# Patient Record
Sex: FEMALE | Race: WHITE | URBAN - METROPOLITAN AREA
[De-identification: names, ages, dates, MRNs, and addresses within clinical notes are randomized per-mention and may not be internally consistent; named-entity substitution may affect disease eponyms.]

---

## 2017-02-13 ENCOUNTER — OFFICE VISIT (OUTPATIENT)
Dept: FAMILY MEDICINE | Facility: CLINIC | Age: 42
End: 2017-02-13
Payer: COMMERCIAL

## 2017-02-13 VITALS
DIASTOLIC BLOOD PRESSURE: 50 MMHG | WEIGHT: 117.5 LBS | HEART RATE: 104 BPM | RESPIRATION RATE: 20 BRPM | TEMPERATURE: 99.1 F | OXYGEN SATURATION: 100 % | HEIGHT: 62 IN | SYSTOLIC BLOOD PRESSURE: 98 MMHG | BODY MASS INDEX: 21.62 KG/M2

## 2017-02-13 DIAGNOSIS — R07.0 THROAT PAIN: Primary | ICD-10-CM

## 2017-02-13 LAB
DEPRECATED S PYO AG THROAT QL EIA: NORMAL
MICRO REPORT STATUS: NORMAL
SPECIMEN SOURCE: NORMAL

## 2017-02-13 PROCEDURE — 87880 STREP A ASSAY W/OPTIC: CPT | Performed by: FAMILY MEDICINE

## 2017-02-13 PROCEDURE — 87081 CULTURE SCREEN ONLY: CPT | Performed by: FAMILY MEDICINE

## 2017-02-13 PROCEDURE — 99213 OFFICE O/P EST LOW 20 MIN: CPT | Performed by: FAMILY MEDICINE

## 2017-02-13 NOTE — NURSING NOTE
"Chief Complaint   Patient presents with     URI       Initial BP 98/50 (BP Location: Right arm, Cuff Size: Adult Regular)  Pulse 104  Temp 99.1  F (37.3  C) (Oral)  Resp 20  Ht 5' 2\" (1.575 m)  Wt 117 lb 8 oz (53.3 kg)  LMP 01/16/2017 (Approximate)  SpO2 100%  Breastfeeding? No  BMI 21.49 kg/m2 Estimated body mass index is 21.49 kg/(m^2) as calculated from the following:    Height as of this encounter: 5' 2\" (1.575 m).    Weight as of this encounter: 117 lb 8 oz (53.3 kg).  Medication Reconciliation: complete     Sallie Gill MA        "

## 2017-02-13 NOTE — PROGRESS NOTES
"  SUBJECTIVE:                                                    Meseret Etienne is a 41 year old female who presents to clinic today for the following health issues:    Sore throat since 02/11/2017. A/s with throat congestion. Last night her jaw was throbbing, pain has resolved. Pt endorses dry cough at night when she is sleeping No fever, chills, facial pain, ear pain or shortness of breath. Pt is taking Ibuprofen Q4H PRN. No contact with strep or mono.     Problem list and histories reviewed & adjusted, as indicated.  Additional history: as documented    Problem list, Medication list, Allergies, and Medical/Social/Surgical histories reviewed in EPIC and updated as appropriate.    ROS:  Constitutional, HEENT, cardiovascular, pulmonary, gi and gu systems are negative, except as otherwise noted.    OBJECTIVE:                                                    BP 98/50 (BP Location: Right arm, Cuff Size: Adult Regular)  Pulse 104  Temp 99.1  F (37.3  C) (Oral)  Resp 20  Ht 5' 2\" (1.575 m)  Wt 117 lb 8 oz (53.3 kg)  LMP 01/16/2017 (Approximate)  SpO2 100%  Breastfeeding? No  BMI 21.49 kg/m2  Body mass index is 21.49 kg/(m^2).  GENERAL: healthy, alert and no distress  EYES: Eyes grossly normal to inspection  HENT: ear canals and TM's normal, nose and mouth without ulcers or lesions  NECK: no adenopathy  RESP: lungs clear to auscultation - no rales, rhonchi or wheezes  CV: regular rate and rhythm, normal S1 S2, no S3 or S4    Diagnostic Test Results:  Results for orders placed or performed in visit on 02/13/17   Rapid strep screen   Result Value Ref Range    Specimen Description Throat     Rapid Strep A Screen       NEGATIVE: No Group A streptococcal antigen detected by immunoassay, await   culture report.      Micro Report Status FINAL 02/13/2017         ASSESSMENT/PLAN:                                                      1. Throat pain  - Rapid strep screen negative, pending Beta strep group A culture  - Continue " supportive cares  - Sudafed or Flonase  - Follow if symptoms worsen or fail to improve.    Zack Fernandez MD  [facility]

## 2017-02-13 NOTE — LETTER
[facility]   [address] [place]   [address]  [phone]    February 15, 2017      Meseret Etienne  [address] [facility] [address]            Dear Ms. Etienne,    Here are your results for your recent labs. Your throat culture was negative which means you do not have streptococcal pharyngitis. Please call or message me if you have questions or concerns.     Results for orders placed or performed in visit on 02/13/17   Rapid strep screen   Result Value Ref Range    Specimen Description Throat     Rapid Strep A Screen       NEGATIVE: No Group A streptococcal antigen detected by immunoassay, await   culture report.      Micro Report Status FINAL 02/13/2017    Beta strep group A culture   Result Value Ref Range    Specimen Description Throat     Culture Micro No Beta Streptococcus isolated     Micro Report Status FINAL 02/15/2017          Sincerely,    Zack Fernandez MD/nr

## 2017-02-13 NOTE — MR AVS SNAPSHOT
"              After Visit Summary   2017    Meseret Etienne    MRN: [id number]           Patient Information     Date Of Birth          1975        Visit Information        Provider Department      2017 2:40 PM Zack Fernandez MD [facility]        Today's Diagnoses     Throat pain    -  1       Follow-ups after your visit        Who to contact     If you have questions or need follow up information about today's clinic visit or your schedule please contact [facility] directly at [phone].  Normal or non-critical lab and imaging results will be communicated to you by Unboundhart, letter or phone within 4 business days after the clinic has received the results. If you do not hear from us within 7 days, please contact the clinic through Unboundhart or phone. If you have a critical or abnormal lab result, we will notify you by phone as soon as possible.  Submit refill requests through 360imaging or call your pharmacy and they will forward the refill request to us. Please allow 3 business days for your refill to be completed.          Additional Information About Your Visit        MyChart Information     360imaging lets you send messages to your doctor, view your test results, renew your prescriptions, schedule appointments and more. To sign up, go to [email or web address] . Click on \"Log in\" on the left side of the screen, which will take you to the Welcome page. Then click on \"Sign up Now\" on the right side of the page.     You will be asked to enter the access code listed below, as well as some personal information. Please follow the directions to create your username and password.     Your access code is: [email or web address]  Expires: 2017  3:14 PM     Your access code will  in 90 days. If you need help or a new code, please call your [facility] or [phone].        Care EveryWhere ID     This is your Care EveryWhere ID. This could be used by other " "organizations to access your [facility] medical records  [id number]        Your Vitals Were     Pulse Temperature Respirations Height Last Period Pulse Oximetry    104 99.1  F (37.3  C) (Oral) 20 5' 2\" (1.575 m) 01/16/2017 (Approximate) 100%    Breastfeeding? BMI (Body Mass Index)                No 21.49 kg/m2           Blood Pressure from Last 3 Encounters:   02/13/17 98/50   10/24/16 100/78   03/14/16 94/62    Weight from Last 3 Encounters:   02/13/17 117 lb 8 oz (53.3 kg)   10/24/16 115 lb (52.2 kg)   08/18/15 104 lb (47.2 kg)              We Performed the Following     Beta strep group A culture     Rapid strep screen        Primary Care Provider Office Phone # Fax #    Lana Culver [phone]       [facility] [address]  [facility] [address]        Thank you!     Thank you for choosing [facility]  for your care. Our goal is always to provide you with excellent care. Hearing back from our patients is one way we can continue to improve our services. Please take a few minutes to complete the written survey that you may receive in the mail after your visit with us. Thank you!             Your Updated Medication List - Protect others around you: Learn how to safely use, store and throw away your medicines at www.disposemymeds.org.          This list is accurate as of: 2/13/17  3:14 PM.  Always use your most recent med list.                   Brand Name Dispense Instructions for use    clindamycin 1 % lotion    CLEOCIN T    60 mL    Apply to affected areas once daily.       * clindamycin-benzoyl Per (Refr) 1.2-5 % Gel     45 g    Apply to affected areas daily as needed       * clindamycin-benzoyl Per (Refr) 1.2-5 % Gel     45 g    Apply to affected areas daily as needed       tretinoin 0.025 % topical gel    RETIN-A    45 g    Spread a pea size amount into affected area topically at bedtime.  Use sunscreen SPF>20.       valACYclovir 1000 mg tablet    VALTREX    " 12 tablet    Take 2 tablets (2,000 mg) by mouth 2 times daily for one day as needed.       * Notice:  This list has 2 medication(s) that are the same as other medications prescribed for you. Read the directions carefully, and ask your doctor or other care provider to review them with you.

## 2017-02-15 ENCOUNTER — OFFICE VISIT (OUTPATIENT)
Dept: FAMILY MEDICINE | Facility: CLINIC | Age: 42
End: 2017-02-15
Payer: COMMERCIAL

## 2017-02-15 ENCOUNTER — TELEPHONE (OUTPATIENT)
Dept: FAMILY MEDICINE | Facility: CLINIC | Age: 42
End: 2017-02-15

## 2017-02-15 VITALS
OXYGEN SATURATION: 99 % | DIASTOLIC BLOOD PRESSURE: 56 MMHG | HEART RATE: 82 BPM | RESPIRATION RATE: 12 BRPM | WEIGHT: 117.5 LBS | TEMPERATURE: 97.5 F | SYSTOLIC BLOOD PRESSURE: 86 MMHG | BODY MASS INDEX: 21.49 KG/M2

## 2017-02-15 DIAGNOSIS — R07.0 THROAT PAIN: ICD-10-CM

## 2017-02-15 DIAGNOSIS — K14.8 TONGUE LESION: ICD-10-CM

## 2017-02-15 DIAGNOSIS — J01.90 ACUTE NON-RECURRENT SINUSITIS, UNSPECIFIED LOCATION: Primary | ICD-10-CM

## 2017-02-15 LAB
BACTERIA SPEC CULT: NORMAL
DEPRECATED S PYO AG THROAT QL EIA: NORMAL
MICRO REPORT STATUS: NORMAL
MICRO REPORT STATUS: NORMAL
SPECIMEN SOURCE: NORMAL
SPECIMEN SOURCE: NORMAL

## 2017-02-15 PROCEDURE — 87880 STREP A ASSAY W/OPTIC: CPT | Performed by: NURSE PRACTITIONER

## 2017-02-15 PROCEDURE — 99213 OFFICE O/P EST LOW 20 MIN: CPT | Performed by: NURSE PRACTITIONER

## 2017-02-15 PROCEDURE — 87081 CULTURE SCREEN ONLY: CPT | Performed by: NURSE PRACTITIONER

## 2017-02-15 RX ORDER — AZITHROMYCIN 250 MG/1
TABLET, FILM COATED ORAL
Qty: 6 TABLET | Refills: 0 | Status: SHIPPED | OUTPATIENT
Start: 2017-02-15 | End: 2018-06-26

## 2017-02-15 NOTE — PATIENT INSTRUCTIONS
1.  Strep test was negative again.  Will cover for strep and sinus infection with azithromycin, 2 pills today, then 1 pill per day for 3 more days.  Ibuprofen 3-4 tabs three times a day.  Salt water gargles.  Humidifier at night.  Plenty of fluids.    2.  Follow up if worsening or not improving by Monday.  If difficulty breathing or drooling during the day go into the ER.

## 2017-02-15 NOTE — NURSING NOTE
"Chief Complaint   Patient presents with     URI       Initial BP (!) 86/56 (BP Location: Left arm, Patient Position: Chair, Cuff Size: Adult Regular)  Pulse 82  Temp 97.5  F (36.4  C) (Tympanic)  Resp 12  Wt 117 lb 8 oz (53.3 kg)  LMP 01/16/2017 (Approximate)  SpO2 99%  BMI 21.49 kg/m2 Estimated body mass index is 21.49 kg/(m^2) as calculated from the following:    Height as of 2/13/17: 5' 2\" (1.575 m).    Weight as of this encounter: 117 lb 8 oz (53.3 kg).  Medication Reconciliation: complete     Wendy Garg, CMA    "

## 2017-02-15 NOTE — PROGRESS NOTES
Please send the letter to the patient with the following.         Here are your results for your recent labs. Your throat culture was negative which means you do not have streptococcal pharyngitis. Please call or message me if you have questions or concerns.

## 2017-02-15 NOTE — MR AVS SNAPSHOT
"              After Visit Summary   2/15/2017    Meseret Etienne    MRN: [id number]           Patient Information     Date Of Birth          1975        Visit Information        Provider Department      2/15/2017 10:20 AM Henrietta Erickson APRN CNP [facility]        Today's Diagnoses     Throat pain    -  1    Acute non-recurrent sinusitis, unspecified location          Care Instructions    1.  Strep test was negative again.  Will cover for strep and sinus infection with azithromycin, 2 pills today, then 1 pill per day for 3 more days.  Ibuprofen 3-4 tabs three times a day.  Salt water gargles.  Humidifier at night.  Plenty of fluids.    2.  Follow up if worsening or not improving by Monday.  If difficulty breathing or drooling during the day go into the ER.        Follow-ups after your visit        Who to contact     If you have questions or need follow up information about today's clinic visit or your schedule please contact [facility] directly at [phone].  Normal or non-critical lab and imaging results will be communicated to you by QFPayhart, letter or phone within 4 business days after the clinic has received the results. If you do not hear from us within 7 days, please contact the clinic through Sliced Applest or phone. If you have a critical or abnormal lab result, we will notify you by phone as soon as possible.  Submit refill requests through Shopear or call your pharmacy and they will forward the refill request to us. Please allow 3 business days for your refill to be completed.          Additional Information About Your Visit        QFPayhart Information     Shopear lets you send messages to your doctor, view your test results, renew your prescriptions, schedule appointments and more. To sign up, go to [email or web address] . Click on \"Log in\" on the left side of the screen, which will take you to the Welcome page. Then click on \"Sign up Now\" on the right side of " the page.     You will be asked to enter the access code listed below, as well as some personal information. Please follow the directions to create your username and password.     Your access code is: [email or web address]  Expires: 2017  3:14 PM     Your access code will  in 90 days. If you need help or a new code, please call your [facility] clinic or [phone].        Care EveryWhere ID     This is your Care EveryWhere ID. This could be used by other organizations to access your [facility] medical records  [id number]        Your Vitals Were     Pulse Temperature Respirations Last Period Pulse Oximetry BMI (Body Mass Index)    82 97.5  F (36.4  C) (Tympanic) 12 2017 (Approximate) 99% 21.49 kg/m2       Blood Pressure from Last 3 Encounters:   02/15/17 (!) 86/56   17 98/50   10/24/16 100/78    Weight from Last 3 Encounters:   02/15/17 117 lb 8 oz (53.3 kg)   17 117 lb 8 oz (53.3 kg)   10/24/16 115 lb (52.2 kg)              We Performed the Following     Beta strep group A culture     Strep, Rapid Screen          Today's Medication Changes          These changes are accurate as of: 2/15/17 10:48 AM.  If you have any questions, ask your nurse or doctor.               Start taking these medicines.        Dose/Directions    azithromycin 250 MG tablet   Commonly known as:  ZITHROMAX   Used for:  Acute non-recurrent sinusitis, unspecified location   Started by:  Henrietta Erickson APRN CNP        Two tablets first day, then one tablet daily for four days.   Quantity:  6 tablet   Refills:  0            Where to get your medicines      These medications were sent to [facility] - [place], [place] - [address]  [address], [facility] [address]     Phone:  [phone]     azithromycin 250 MG tablet                Primary Care Provider Office Phone # Fax #    Lana Culver [phone]       [facility] [address]  [facility] [address]         Thank you!     Thank you for choosing [facility]  for your care. Our goal is always to provide you with excellent care. Hearing back from our patients is one way we can continue to improve our services. Please take a few minutes to complete the written survey that you may receive in the mail after your visit with us. Thank you!             Your Updated Medication List - Protect others around you: Learn how to safely use, store and throw away your medicines at www.disposemymeds.org.          This list is accurate as of: 2/15/17 10:48 AM.  Always use your most recent med list.                   Brand Name Dispense Instructions for use    azithromycin 250 MG tablet    ZITHROMAX    6 tablet    Two tablets first day, then one tablet daily for four days.       clindamycin 1 % lotion    CLEOCIN T    60 mL    Apply to affected areas once daily.       * clindamycin-benzoyl Per (Refr) 1.2-5 % Gel     45 g    Apply to affected areas daily as needed       * clindamycin-benzoyl Per (Refr) 1.2-5 % Gel     45 g    Apply to affected areas daily as needed       tretinoin 0.025 % topical gel    RETIN-A    45 g    Spread a pea size amount into affected area topically at bedtime.  Use sunscreen SPF>20.       valACYclovir 1000 mg tablet    VALTREX    12 tablet    Take 2 tablets (2,000 mg) by mouth 2 times daily for one day as needed.       * Notice:  This list has 2 medication(s) that are the same as other medications prescribed for you. Read the directions carefully, and ask your doctor or other care provider to review them with you.

## 2017-02-15 NOTE — TELEPHONE ENCOUNTER
Meseret Etienne is a 41 year old female who calls with worsening sore throat.    NURSING ASSESSMENT:  Description:  Patient seen 2/13/17 for sore throat - strep swap and culture negative - calling today with worsening symptoms  Onset/duration:  2 days  Precip. factors: sore throat  Associated symptoms:    1. Sore throat - states worsening over last two days   -white patches   -increased soreness/pain   -drooling   -denies SOB, wheezing, inability to swallow    -patient is speaking in full sentences      Last exam/Treatment:  2/13/2017  Allergies:   Allergies   Allergen Reactions     Penicillins Swelling     Eye lids       Latex Rash     NURSING PLAN: Nursing advice to patient to clinic in one hour - office visit with Dre 10:20 - to ED/911 for inability to breathe, throat closing, cannot swallow    RECOMMENDED DISPOSITION:  To office now, another person to drive - see above  Will comply with recommendation: Yes  If further questions/concerns or if symptoms do not improve, worsen or new symptoms develop, call your PCP or [facility] as soon as possible.      Guideline used:  Telephone Triage Protocols for Nurses, Fifth Edition, Bianca Baldwin RN

## 2017-02-15 NOTE — PROGRESS NOTES
SUBJECTIVE:                                                    Meseret Etienne is a 41 year old female who presents to clinic today for the following health issues:      RESPIRATORY SYMPTOMS      Duration: Saturday    Description  sore throat and drooling, painful swallowing, trouble breathing at night    Severity: severe    Accompanying signs and symptoms: None    History (predisposing factors):  none    Precipitating or alleviating factors: None    Therapies tried and outcome:  Guaifenesin, chloraseptic, cough drops, tea, saltwater gargles, popsicles       Seen for throat pain x 2 days 2/13/17, negative RST, was advised on supportive cares.    Sore throat is getting worse, now having difficulty swallowing.  No difficulty breathing, drooling more in her sleep because it hurts so much to swallow.  Chills last night, temp 99.9.  Mild fatigue but nothing significant.    Also having mild facial pressure, upper jaw pain.  Headache.  Rhinitis and congestion started last night.  Throat clearing cough.  No shortness of breath or wheezing.    Works at a spa.      No history recurrent tonsillitis or sinus infection in the past.      Negative oral cancer screening by dentist 1 month ago.    Patient Active Problem List   Diagnosis     Acne     Recurrent oral herpes simplex     Cervical high risk HPV (human papillomavirus) test positive     Past Surgical History   Procedure Laterality Date     No history of surgery       Dilation and curettage         Social History   Substance Use Topics     Smoking status: Former Smoker     Packs/day: 1.00     Types: Cigarettes     Smokeless tobacco: Not on file      Comment: occ     Alcohol use Yes      Comment: occasionally     Family History   Problem Relation Age of Onset     Asthma Mother      Cardiovascular Maternal Grandmother      pacemaker     C.A.D. No family hx of      DIABETES No family hx of      Hypertension No family hx of      Breast Cancer No family hx of      Cancer -  colorectal No family hx of      CEREBROVASCULAR DISEASE No family hx of          Current Outpatient Prescriptions   Medication Sig Dispense Refill     azithromycin (ZITHROMAX) 250 MG tablet Two tablets first day, then one tablet daily for four days. 6 tablet 0     clindamycin (CLEOCIN T) 1 % lotion Apply to affected areas once daily. 60 mL 3     clindamycin-benzoyl Per, Refr, 1.2-5 % GEL Apply to affected areas daily as needed 45 g 3     tretinoin (RETIN-A) 0.025 % gel Spread a pea size amount into affected area topically at bedtime.  Use sunscreen SPF>20. 45 g 3     valACYclovir (VALTREX) 1000 mg tablet Take 2 tablets (2,000 mg) by mouth 2 times daily for one day as needed. 12 tablet 1     clindamycin-benzoyl Per, Refr, 1.2-5 % GEL Apply to affected areas daily as needed 45 g 1       ROS:  Const, HEENT, Resp as above, otherwise negative       OBJECTIVE:                                                    BP (!) 86/56 (BP Location: Left arm, Patient Position: Chair, Cuff Size: Adult Regular)  Pulse 82  Temp 97.5  F (36.4  C) (Tympanic)  Resp 12  Wt 117 lb 8 oz (53.3 kg)  LMP 01/16/2017 (Approximate)  SpO2 99%  BMI 21.49 kg/m2   GENERAL APPEARANCE: healthy, alert and no distress  EYES: Eyes grossly normal to inspection and conjunctivae and sclerae normal  HENT: ear canals and TM's normal.  Tonsils +2 bilaterally with white exudate present.  Brown rased lesion to back/center of tongue, does seem to partially scrape off.  Persistent despite drinking water.  NECK: no adenopathy  RESP: lungs clear to auscultation - no rales, rhonchi or wheezes  CV: regular rates and rhythm, normal S1 S2, no S3 or S4 and no murmur, click or rub  PSYCH: mentation appears normal and affect normal/bright     Diagnostic test results:  Diagnostic Test Results:  Results for orders placed or performed in visit on 02/15/17 (from the past 24 hour(s))   Strep, Rapid Screen   Result Value Ref Range    Specimen Description Throat     Rapid Strep A  Screen       NEGATIVE: No Group A streptococcal antigen detected by immunoassay, await   culture report.      Micro Report Status FINAL 02/15/2017         ASSESSMENT/PLAN:                                                    (J01.90) Acute non-recurrent sinusitis, unspecified location  Comment: given subjective fever, tonsilar exudate, and sinus symptoms will treat for possible bacterial infection  Plan: azithromycin (ZITHROMAX) 250 MG tablet        Start zpack, scheduled ibuprofen, salt water gargles.  Follow up if not improving by Monday, sooner if worsening.  If difficulty breathing or drooling during the day go into the ER.    R07.0) Throat pain  (primary encounter diagnosis)  Plan: Strep, Rapid Screen, Beta strep group A culture        As above     (K14.8) Tongue lesion  Comment: unclear etiology, appearance not consistent with SCC but pt does smoke tobacco  Plan: monitor, if not resolving in 1-2 weeks see ENT      See Patient Instructions    Henrietta Erickson [facility]    Patient Instructions   1.  Strep test was negative again.  Will cover for strep and sinus infection with azithromycin, 2 pills today, then 1 pill per day for 3 more days.  Ibuprofen 3-4 tabs three times a day.  Salt water gargles.  Humidifier at night.  Plenty of fluids.    2.  Follow up if worsening or not improving by Monday.  If difficulty breathing or drooling during the day go into the ER.

## 2017-02-17 LAB
BACTERIA SPEC CULT: NORMAL
MICRO REPORT STATUS: NORMAL
SPECIMEN SOURCE: NORMAL

## 2017-03-17 ENCOUNTER — OFFICE VISIT (OUTPATIENT)
Dept: URGENT CARE | Facility: URGENT CARE | Age: 42
End: 2017-03-17
Payer: COMMERCIAL

## 2017-03-17 VITALS
TEMPERATURE: 97.5 F | SYSTOLIC BLOOD PRESSURE: 124 MMHG | DIASTOLIC BLOOD PRESSURE: 75 MMHG | BODY MASS INDEX: 21.71 KG/M2 | OXYGEN SATURATION: 97 % | WEIGHT: 115 LBS | HEART RATE: 91 BPM | HEIGHT: 61 IN

## 2017-03-17 DIAGNOSIS — J03.90 TONSILLITIS: Primary | ICD-10-CM

## 2017-03-17 DIAGNOSIS — R07.0 THROAT PAIN: ICD-10-CM

## 2017-03-17 LAB
DEPRECATED S PYO AG THROAT QL EIA: NORMAL
MICRO REPORT STATUS: NORMAL
SPECIMEN SOURCE: NORMAL

## 2017-03-17 PROCEDURE — 87880 STREP A ASSAY W/OPTIC: CPT | Performed by: INTERNAL MEDICINE

## 2017-03-17 PROCEDURE — 87081 CULTURE SCREEN ONLY: CPT | Performed by: INTERNAL MEDICINE

## 2017-03-17 PROCEDURE — 99213 OFFICE O/P EST LOW 20 MIN: CPT | Performed by: FAMILY MEDICINE

## 2017-03-17 RX ORDER — CLINDAMYCIN HCL 300 MG
300 CAPSULE ORAL 4 TIMES DAILY
Qty: 28 CAPSULE | Refills: 0 | Status: SHIPPED | OUTPATIENT
Start: 2017-03-17 | End: 2017-03-24

## 2017-03-17 NOTE — MR AVS SNAPSHOT
"              After Visit Summary   3/17/2017    Meseret Etienne    MRN: [id number]           Patient Information     Date Of Birth          1975        Visit Information        Provider Department      3/17/2017 5:00 PM Daksha Real DO [facility]        Today's Diagnoses     Throat pain    -  1       Follow-ups after your visit        Who to contact     If you have questions or need follow up information about today's clinic visit or your schedule please contact [facility]RG[facility] directly at [phone].  Normal or non-critical lab and imaging results will be communicated to you by [employer], letter or phone within 4 business days after the clinic has received the results. If you do not hear from us within 7 days, please contact the clinic through [employer] or phone. If you have a critical or abnormal lab result, we will notify you by phone as soon as possible.  Submit refill requests through [employer] or call your pharmacy and they will forward the refill request to us. Please allow 3 business days for your refill to be completed.          Additional Information About Your Visit        MyChart Information     [employer] lets you send messages to your doctor, view your test results, renew your prescriptions, schedule appointments and more. To sign up, go to [email or web address] . Click on \"Log in\" on the left side of the screen, which will take you to the Welcome page. Then click on \"Sign up Now\" on the right side of the page.     You will be asked to enter the access code listed below, as well as some personal information. Please follow the directions to create your username and password.     Your access code is: [email or web address]  Expires: 2017  4:14 PM     Your access code will  in 90 days. If you need help or a new code, please call your [place] clinic or [phone].        Care EveryWhere ID     This is your Care EveryWhere ID. This could be used " "by other organizations to access your [place] medical records  [id number]        Your Vitals Were     Pulse Temperature Height Last Period Pulse Oximetry Breastfeeding?    91 97.5  F (36.4  C) (Tympanic) 5' 1\" (1.549 m) 02/17/2017 97% No    BMI (Body Mass Index)                   21.73 kg/m2            Blood Pressure from Last 3 Encounters:   03/17/17 124/75   02/15/17 (!) 86/56   02/13/17 98/50    Weight from Last 3 Encounters:   03/17/17 115 lb (52.2 kg)   02/15/17 117 lb 8 oz (53.3 kg)   02/13/17 117 lb 8 oz (53.3 kg)              We Performed the Following     Beta strep group A culture     Strep, Rapid Screen          Today's Medication Changes          These changes are accurate as of: 3/17/17  5:40 PM.  If you have any questions, ask your nurse or doctor.               Start taking these medicines.        Dose/Directions    clindamycin 300 MG capsule   Commonly known as:  CLEOCIN   Used for:  Throat pain   Started by:  Daksha Real DO        Dose:  300 mg   Take 1 capsule (300 mg) by mouth 4 times daily for 7 days   Quantity:  28 capsule   Refills:  0            Where to get your medicines      These medications were sent to [place] [address] [place] [facility] [place], [place] - [address] [facility]  [address], [place] [address]     Phone:  [phone]     clindamycin 300 MG capsule                Primary Care Provider Office Phone # Fax #    Lana Culver [phone]       [facility] [address]  [facility] [address]        Thank you!     Thank you for choosing [facility]RGENT CARE  for your care. Our goal is always to provide you with excellent care. Hearing back from our patients is one way we can continue to improve our services. Please take a few minutes to complete the written survey that you may receive in the mail after your visit with us. Thank you!             Your Updated Medication List - Protect others around you: Learn how to safely use, store " and throw away your medicines at www.disposemymeds.org.          This list is accurate as of: 3/17/17  5:40 PM.  Always use your most recent med list.                   Brand Name Dispense Instructions for use    azithromycin 250 MG tablet    ZITHROMAX    6 tablet    Two tablets first day, then one tablet daily for four days.       clindamycin 1 % lotion    CLEOCIN T    60 mL    Apply to affected areas once daily.       clindamycin 300 MG capsule    CLEOCIN    28 capsule    Take 1 capsule (300 mg) by mouth 4 times daily for 7 days       * clindamycin-benzoyl Per (Refr) 1.2-5 % Gel     45 g    Apply to affected areas daily as needed       * clindamycin-benzoyl Per (Refr) 1.2-5 % Gel     45 g    Apply to affected areas daily as needed       tretinoin 0.025 % topical gel    RETIN-A    45 g    Spread a pea size amount into affected area topically at bedtime.  Use sunscreen SPF>20.       valACYclovir 1000 mg tablet    VALTREX    12 tablet    Take 2 tablets (2,000 mg) by mouth 2 times daily for one day as needed.       * Notice:  This list has 2 medication(s) that are the same as other medications prescribed for you. Read the directions carefully, and ask your doctor or other care provider to review them with you.

## 2017-03-17 NOTE — NURSING NOTE
"Chief Complaint   Patient presents with     Urgent Care     Pharyngitis     c/o sore throat.fever.HA and bodyaches       Initial /75 (BP Location: Left arm, Patient Position: Chair, Cuff Size: Adult Regular)  Pulse 91  Temp 97.5  F (36.4  C) (Tympanic)  Ht 5' 1\" (1.549 m)  Wt 115 lb (52.2 kg)  LMP 02/17/2017  SpO2 97%  Breastfeeding? No  BMI 21.73 kg/m2 Estimated body mass index is 21.73 kg/(m^2) as calculated from the following:    Height as of this encounter: 5' 1\" (1.549 m).    Weight as of this encounter: 115 lb (52.2 kg).  Medication Reconciliation: complete   Melvina Shea MA    "

## 2017-03-19 LAB
BACTERIA SPEC CULT: NORMAL
MICRO REPORT STATUS: NORMAL
SPECIMEN SOURCE: NORMAL

## 2017-03-23 ENCOUNTER — TELEPHONE (OUTPATIENT)
Dept: FAMILY MEDICINE | Facility: CLINIC | Age: 42
End: 2017-03-23

## 2017-03-23 DIAGNOSIS — L98.9 SKIN PROBLEM: Primary | ICD-10-CM

## 2017-03-23 NOTE — TELEPHONE ENCOUNTER
Reason for Call: Request for an order or referral:    Order or referral being requested: DERMATOLOGIST    Date needed: as soon as possible    Has the patient been seen by the PCP for this problem? NO    Additional comments: Pt has raised bumps on her chin. It is not acne. The bumps  have been there for awhile. Pt would like to see a Dermatologist. Pt's insurance is [employer] [place] which does not require an insurance referral only an Order approved by Dr Culver.    Phone number Patient can be reached at:  Home number on file [phone] (home)    Best Time:  Any Time    Can we leave a detailed message on this number?  YES    Call taken on 3/23/2017 at 2:40 PM by Yaritza Kahn

## 2017-03-23 NOTE — TELEPHONE ENCOUNTER
I left a voice mail for Meseret to ask her to call back to give us an idea what part of town she would like to go and also does she just want the phone numbers or does she want the referral mailed to her?    Gloria Juarez RN

## 2017-03-24 NOTE — TELEPHONE ENCOUNTER
Patient called back stating she lives in [place] [facility] [place] so anywhere close to [place] [facility] [place] and giving phone numbers is fine

## 2017-03-24 NOTE — TELEPHONE ENCOUNTER
Called patient, who asked writer to call back and leave voicemail with referral information as patient currently driving.    Writer left voicemail with referral information.    PRACHI CainN, RN

## 2017-11-25 ENCOUNTER — HEALTH MAINTENANCE LETTER (OUTPATIENT)
Age: 42
End: 2017-11-25

## 2017-12-30 ENCOUNTER — HEALTH MAINTENANCE LETTER (OUTPATIENT)
Age: 42
End: 2017-12-30

## 2018-01-18 ENCOUNTER — TELEPHONE (OUTPATIENT)
Dept: FAMILY MEDICINE | Facility: CLINIC | Age: 43
End: 2018-01-18

## 2018-05-17 ENCOUNTER — TELEPHONE (OUTPATIENT)
Dept: FAMILY MEDICINE | Facility: CLINIC | Age: 43
End: 2018-05-17

## 2018-05-17 DIAGNOSIS — R87.810 CERVICAL HIGH RISK HPV (HUMAN PAPILLOMAVIRUS) TEST POSITIVE: ICD-10-CM

## 2018-05-17 NOTE — TELEPHONE ENCOUNTER
Pt is past due for f/u pap smear/HPV test.  1 reminder letter sent previously.  Left msg today for patient to call clinic to schedule.    If no reply and/or appt within two weeks (5/31/18) patient will be considered lost to pap tracking f/u.    PRACHI CookN, RN, Pap Tracking Nurse

## 2018-06-26 ENCOUNTER — TELEPHONE (OUTPATIENT)
Dept: FAMILY MEDICINE | Facility: CLINIC | Age: 43
End: 2018-06-26

## 2018-06-26 ENCOUNTER — OFFICE VISIT (OUTPATIENT)
Dept: FAMILY MEDICINE | Facility: CLINIC | Age: 43
End: 2018-06-26
Payer: COMMERCIAL

## 2018-06-26 VITALS
DIASTOLIC BLOOD PRESSURE: 76 MMHG | HEIGHT: 61 IN | BODY MASS INDEX: 22.66 KG/M2 | WEIGHT: 120 LBS | RESPIRATION RATE: 14 BRPM | TEMPERATURE: 98.2 F | SYSTOLIC BLOOD PRESSURE: 118 MMHG | OXYGEN SATURATION: 99 % | HEART RATE: 76 BPM

## 2018-06-26 DIAGNOSIS — Z01.411 ENCOUNTER FOR GYNECOLOGICAL EXAMINATION WITH ABNORMAL FINDING: Primary | ICD-10-CM

## 2018-06-26 DIAGNOSIS — R87.810 CERVICAL HIGH RISK HPV (HUMAN PAPILLOMAVIRUS) TEST POSITIVE: ICD-10-CM

## 2018-06-26 DIAGNOSIS — L70.0 ACNE VULGARIS: ICD-10-CM

## 2018-06-26 PROBLEM — Z13.6 CARDIOVASCULAR SCREENING; LDL GOAL LESS THAN 160: Status: ACTIVE | Noted: 2018-06-26

## 2018-06-26 PROCEDURE — 88175 CYTOPATH C/V AUTO FLUID REDO: CPT | Performed by: PHYSICIAN ASSISTANT

## 2018-06-26 PROCEDURE — G0476 HPV COMBO ASSAY CA SCREEN: HCPCS | Performed by: PHYSICIAN ASSISTANT

## 2018-06-26 PROCEDURE — 99396 PREV VISIT EST AGE 40-64: CPT | Performed by: PHYSICIAN ASSISTANT

## 2018-06-26 RX ORDER — TRETINOIN 0.25 MG/G
GEL TOPICAL
Qty: 45 G | Refills: 3 | Status: SHIPPED | OUTPATIENT
Start: 2018-06-26 | End: 2019-03-15

## 2018-06-26 RX ORDER — CLINDAMYCIN PHOSPHATE 10 UG/ML
LOTION TOPICAL
Qty: 60 ML | Refills: 3 | Status: SHIPPED | OUTPATIENT
Start: 2018-06-26

## 2018-06-26 RX ORDER — CLINDAMYCIN PHOSPHATE AND BENZOYL PEROXIDE 10; 50 MG/G; MG/G
GEL TOPICAL
Qty: 45 G | Refills: 3 | Status: CANCELLED | OUTPATIENT
Start: 2018-06-26

## 2018-06-26 RX ORDER — CLINDAMYCIN PHOSPHATE AND BENZOYL PEROXIDE 10; 50 MG/G; MG/G
GEL TOPICAL
Qty: 45 G | Refills: 3 | Status: SHIPPED | OUTPATIENT
Start: 2018-06-26

## 2018-06-26 NOTE — TELEPHONE ENCOUNTER
PA needed on Clindamycin Phos-Benzoyl 1.2-5 Gel  Pt insurance is [facility]  Insurance phone number: [phone] (PA line)  Pt ID number: [id number]  Please let us know when PA is granted/denied.    Johnny Tripathi  [facility]  [phone]    Thank you.

## 2018-06-26 NOTE — LETTER
July 5, 2018    Meseret Etienne  [address]  [facility] [address]    Dear Meseret,  We are happy to inform you that your PAP smear result from 06/26/18 is normal.  We are now able to do a follow up test on PAP smears. The DNA test is for HPV (Human Papilloma Virus). Cervical cancer is closely linked with certain types of HPV. Your results showed no evidence of high risk HPV.  Therefore we recommend you return in 3 years for your next pap smear and HPV test.  You will still need to return to the clinic every year for an annual exam and other preventive tests.  Please contact the clinic at [phone] with any questions.  Sincerely,    Ruby Nix PA-C/steffen

## 2018-06-26 NOTE — PATIENT INSTRUCTIONS
Preventive Health Recommendations  Female Ages 40 to 49    Yearly exam:     See your health care provider every year in order to  1. Review health changes.   2. Discuss preventive care.    3. Review your medicines if your doctor prescribed any.      Get a Pap test every three years (unless you have an abnormal result and your provider advises testing more often).      If you get Pap tests with HPV test, you only need to test every 5 years, unless you have an abnormal result. You do not need a Pap test if your uterus was removed (hysterectomy) and you have not had cancer.      You should be tested each year for STDs (sexually transmitted diseases), if you're at risk.     Ask your doctor if you should have a mammogram.      Have a colonoscopy (test for colon cancer) if someone in your family has had colon cancer or polyps before age 50.       Have a cholesterol test every 5 years.       Have a diabetes test (fasting glucose) after age 45. If you are at risk for diabetes, you should have this test every 3 years.    Shots: Get a flu shot each year. Get a tetanus shot every 10 years.     Nutrition:     Eat at least 5 servings of fruits and vegetables each day.    Eat whole-grain bread, whole-wheat pasta and brown rice instead of white grains and rice.    Get adequate Calcium and Vitamin D.      Lifestyle    Exercise at least 150 minutes a week (an average of 30 minutes a day, 5 days a week). This will help you control your weight and prevent disease.    Limit alcohol to one drink per day.    No smoking.     Wear sunscreen to prevent skin cancer.    See your dentist every six months for an exam and cleaning.    Recommend trial of a daily probiotic agent; some common options include: Align, Culturelle, Digestive Advantage, Florastor, Activia yogurt, or Kefir.  Choose one agent (or a comparable generic OTC formulation) that is affordable/palatable and use it daily for at least 3-6 months to determine its baseline  effect.  Encouraged 20 billion units per day for probiotic dosage.

## 2018-06-26 NOTE — PROGRESS NOTES
SUBJECTIVE:   CC: Meseret Etienne is an 42 year old woman who presents for preventive health visit.     Physical   Annual:     Getting at least 3 servings of Calcium per day:  Yes    Bi-annual eye exam:  NO    Dental care twice a year:  Yes    Sleep apnea or symptoms of sleep apnea:  None    Diet:  Regular (no restrictions)    Frequency of exercise:  2-3 days/week    Duration of exercise:  15-30 minutes    Taking medications regularly:  Yes    Medication side effects:  Not applicable    Additional concerns today:  No        Today's PHQ-2 Score:   PHQ-2 ( 1999 [employer]) 6/26/2018   Q1: Little interest or pleasure in doing things 0   Q2: Feeling down, depressed or hopeless 0   PHQ-2 Score 0   Q1: Little interest or pleasure in doing things Not at all   Q2: Feeling down, depressed or hopeless Not at all   PHQ-2 Score 0       Abuse: Current or Past (Physical, Sexual or Emotional) - No  Do you feel safe in your environment - Yes    Social History   Substance Use Topics     Smoking status: Former Smoker     Packs/day: 1.00     Types: Cigarettes     Smokeless tobacco: Never Used      Comment: occ     Alcohol use Yes      Comment: occasionally     Alcohol Use 6/26/2018   If you drink alcohol do you typically have greater than 3 drinks per day OR greater than 7 drinks per week? No   No flowsheet data found.    Reviewed orders with patient.  Reviewed health maintenance and updated orders accordingly - Yes  Labs reviewed in EPIC  Patient Active Problem List   Diagnosis     Acne     Recurrent oral herpes simplex     Cervical high risk HPV (human papillomavirus) test positive     CARDIOVASCULAR SCREENING; LDL GOAL LESS THAN 160     Past Surgical History:   Procedure Laterality Date     DILATION AND CURETTAGE       NO HISTORY OF SURGERY         Social History   Substance Use Topics     Smoking status: Former Smoker     Packs/day: 1.00     Types: Cigarettes     Smokeless tobacco: Never Used      Comment: occ     Alcohol use  Yes      Comment: occasionally     Family History   Problem Relation Age of Onset     Asthma Mother      Cardiovascular Maternal Grandmother      pacemaker     C.A.D. No family hx of      Diabetes No family hx of      Hypertension No family hx of      Breast Cancer No family hx of      Cancer - colorectal No family hx of      Cerebrovascular Disease No family hx of          Current Outpatient Prescriptions   Medication Sig Dispense Refill     clindamycin (CLEOCIN T) 1 % lotion Apply to affected areas once daily. 60 mL 3     clindamycin-benzoyl Per, Refr, 1.2-5 % GEL Apply to affected areas daily as needed 45 g 3     tretinoin (RETIN-A) 0.025 % topical gel Spread a pea size amount into affected area topically at bedtime.  Use sunscreen SPF>20. 45 g 3     valACYclovir (VALTREX) 1000 mg tablet Take 2 tablets (2,000 mg) by mouth 2 times daily for one day as needed. 12 tablet 1     Allergies   Allergen Reactions     Penicillins Swelling     Eye lids       Latex Rash       Patient under age 50, mutual decision reflected in health maintenance.      Pertinent mammograms are reviewed under the imaging tab.  History of abnormal Pap smear: YES - updated in Problem List and Health Maintenance accordingly  PAP / HPV Latest Ref Rng & Units 10/24/2016 8/8/2005   PAP - NIL NIL   HPV 16 DNA NEG Negative -   HPV 18 DNA NEG Negative -   OTHER HR HPV NEG Positive(A) -     Reviewed and updated as needed this visit by clinical staff  Tobacco  Allergies  Meds  Problems  Med Hx  Surg Hx  Fam Hx  Soc Hx          Reviewed and updated as needed this visit by Provider  Allergies  Meds  Problems            Review of Systems  CONSTITUTIONAL: NEGATIVE for fever, chills, change in weight  INTEGUMENTARU/SKIN: NEGATIVE for worrisome rashes, moles or lesions  EYES: NEGATIVE for vision changes or irritation  ENT: NEGATIVE for ear, mouth and throat problems  RESP: NEGATIVE for significant cough or SOB  BREAST: NEGATIVE for masses, tenderness  "or discharge  CV: NEGATIVE for chest pain, palpitations or peripheral edema  GI: NEGATIVE for nausea, abdominal pain, heartburn, or change in bowel habits  : NEGATIVE for unusual urinary or vaginal symptoms. Periods are regular.  MUSCULOSKELETAL: NEGATIVE for significant arthralgias or myalgia  NEURO: NEGATIVE for weakness, dizziness or paresthesias  PSYCHIATRIC: NEGATIVE for changes in mood or affect     OBJECTIVE:   /76  Pulse 76  Temp 98.2  F (36.8  C) (Oral)  Resp 14  Ht 5' 1\" (1.549 m)  Wt 120 lb (54.4 kg)  LMP 06/04/2018  SpO2 99%  BMI 22.67 kg/m2  Physical Exam  GENERAL: healthy, alert and no distress  EYES: Eyes grossly normal to inspection, PERRL and conjunctivae and sclerae normal  HENT: ear canals and TM's normal, nose and mouth without ulcers or lesions  NECK: no adenopathy, no asymmetry, masses, or scars and thyroid normal to palpation  RESP: lungs clear to auscultation - no rales, rhonchi or wheezes  BREAST: deferred by patient, no concerns  CV: regular rate and rhythm, normal S1 S2, no S3 or S4, no murmur, click or rub, no peripheral edema and peripheral pulses strong  ABDOMEN: soft, nontender, no hepatosplenomegaly, no masses and bowel sounds normal   (female): normal female external genitalia, normal urethral meatus, vaginal mucosa pink, moist, well rugated, and normal cervix/adnexa/uterus without masses or discharge; pap smear done today  MS: no gross musculoskeletal defects noted, no edema  SKIN: no suspicious lesions or rashes  NEURO: Normal strength and tone, mentation intact and speech normal  PSYCH: mentation appears normal, affect normal/bright    Diagnostic Test Results:  none     ASSESSMENT/PLAN:       ICD-10-CM    1. Encounter for gynecological examination with abnormal finding Z01.411 PAP imaged thin layer, diagnostic     HPV High Risk Types DNA Cervical   2. Cervical high risk HPV (human papillomavirus) test positive R87.810 PAP imaged thin layer, diagnostic     HPV " "High Risk Types DNA Cervical   3. Acne vulgaris L70.0 tretinoin (RETIN-A) 0.025 % topical gel     clindamycin-benzoyl Per, Refr, 1.2-5 % GEL     clindamycin (CLEOCIN T) 1 % lotion       COUNSELING:  Reviewed preventive health counseling, as reflected in patient instructions       Regular exercise       Healthy diet/nutrition       Vision screening    BP Readings from Last 1 Encounters:   06/26/18 118/76     Estimated body mass index is 22.67 kg/(m^2) as calculated from the following:    Height as of this encounter: 5' 1\" (1.549 m).    Weight as of this encounter: 120 lb (54.4 kg).     reports that she has quit smoking. Her smoking use included Cigarettes. She smoked 1.00 pack per day. She has never used smokeless tobacco.      Counseling Resources:  ATP IV Guidelines  Pooled Cohorts Equation Calculator  Breast Cancer Risk Calculator  FRAX Risk Assessment  ICSI Preventive Guidelines  Dietary Guidelines for Americans, 2010  USDA's MyPlate  ASA Prophylaxis  Lung CA Screening    Ruby Nix PA-C  [facility]    Answers for HPI/ROS submitted by the patient on 6/26/2018   PHQ-2 Score: 0    "

## 2018-06-26 NOTE — MR AVS SNAPSHOT
After Visit Summary   6/26/2018    Meseret Etienne    MRN: [id number]           Patient Information     Date Of Birth          1975        Visit Information        Provider Department      6/26/2018 10:20 AM Ruby Nix PA-C [facility] [place]        Today's Diagnoses     Encounter for gynecological examination with abnormal finding    -  1    Cervical high risk HPV (human papillomavirus) test positive        Acne vulgaris        Screening for diabetes mellitus        Screening, anemia, deficiency, iron        Screening for human immunodeficiency virus without presence of risk factors        CARDIOVASCULAR SCREENING; LDL GOAL LESS THAN 160          Care Instructions      Preventive Health Recommendations  Female Ages 40 to 49    Yearly exam:     See your health care provider every year in order to  1. Review health changes.   2. Discuss preventive care.    3. Review your medicines if your doctor prescribed any.      Get a Pap test every three years (unless you have an abnormal result and your provider advises testing more often).      If you get Pap tests with HPV test, you only need to test every 5 years, unless you have an abnormal result. You do not need a Pap test if your uterus was removed (hysterectomy) and you have not had cancer.      You should be tested each year for STDs (sexually transmitted diseases), if you're at risk.     Ask your doctor if you should have a mammogram.      Have a colonoscopy (test for colon cancer) if someone in your family has had colon cancer or polyps before age 50.       Have a cholesterol test every 5 years.       Have a diabetes test (fasting glucose) after age 45. If you are at risk for diabetes, you should have this test every 3 years.    Shots: Get a flu shot each year. Get a tetanus shot every 10 years.     Nutrition:     Eat at least 5 servings of fruits and vegetables each day.    Eat whole-grain bread, whole-wheat pasta and  "brown rice instead of white grains and rice.    Get adequate Calcium and Vitamin D.      Lifestyle    Exercise at least 150 minutes a week (an average of 30 minutes a day, 5 days a week). This will help you control your weight and prevent disease.    Limit alcohol to one drink per day.    No smoking.     Wear sunscreen to prevent skin cancer.    See your dentist every six months for an exam and cleaning.    Recommend trial of a daily probiotic agent; some common options include: Align, Culturelle, Digestive Advantage, Florastor, Activia yogurt, or Kefir.  Choose one agent (or a comparable generic OTC formulation) that is affordable/palatable and use it daily for at least 3-6 months to determine its baseline effect.  Encouraged 20 billion units per day for probiotic dosage.            Follow-ups after your visit        Who to contact     If you have questions or need follow up information about today's clinic visit or your schedule please contact [facility] directly at [phone].  Normal or non-critical lab and imaging results will be communicated to you by MyChart, letter or phone within 4 business days after the clinic has received the results. If you do not hear from us within 7 days, please contact the clinic through "[employer]hart or phone. If you have a critical or abnormal lab result, we will notify you by phone as soon as possible.  Submit refill requests through [employer] or call your pharmacy and they will forward the refill request to us. Please allow 3 business days for your refill to be completed.          Additional Information About Your Visit        Care EveryWhere ID     This is your Care EveryWhere ID. This could be used by other organizations to access your [facility] medical records  [id number]        Your Vitals Were     Pulse Temperature Respirations Height Last Period Pulse Oximetry    76 98.2  F (36.8  C) (Oral) 14 5' 1\" (1.549 m) 06/04/2018 99%    BMI (Body Mass Index)                "    22.67 kg/m2            Blood Pressure from Last 3 Encounters:   06/26/18 118/76   03/17/17 124/75   02/15/17 (!) 86/56    Weight from Last 3 Encounters:   06/26/18 120 lb (54.4 kg)   03/17/17 115 lb (52.2 kg)   02/15/17 117 lb 8 oz (53.3 kg)              We Performed the Following     HPV High Risk Types DNA Cervical     PAP imaged thin layer, diagnostic          Today's Medication Changes          These changes are accurate as of 6/26/18 10:46 AM.  If you have any questions, ask your nurse or doctor.               These medicines have changed or have updated prescriptions.        Dose/Directions    clindamycin-benzoyl Per (Refr) 1.2-5 % Gel   This may have changed:  Another medication with the same name was removed. Continue taking this medication, and follow the directions you see here.   Used for:  Acne vulgaris   Changed by:  Ruby Nix PA-C        Apply to affected areas daily as needed   Quantity:  45 g   Refills:  3            Where to get your medicines      These medications were sent to [facility] [place], [place] - [address]  [address] [facility] [address]     Phone:  [phone]     clindamycin 1 % lotion    clindamycin-benzoyl Per (Refr) 1.2-5 % Gel    tretinoin 0.025 % topical gel                Primary Care Provider Office Phone # Fax #    Lana Culver [phone]       [address]  [facility] [address]        Equal Access to Services     ANDER VICKERS : Hadii cailin ku hadasho Sogeorgieali, waaxda luqadaha, qaybta kaalmada adeegyada, samuel ceballos. So [facility] [phone].    ATENCIÓN: Si habla español, tiene a savage disposición servicios gratuitos de asistencia lingüística. Llame al [phone].    We comply with applicable federal civil rights laws and [place] laws. We do not discriminate on the basis of race, color, national origin, age, disability, sex, sexual orientation, or gender identity.             Thank you!     Thank you for choosing [facility]  for your care. Our goal is always to provide you with excellent care. Hearing back from our patients is one way we can continue to improve our services. Please take a few minutes to complete the written survey that you may receive in the mail after your visit with us. Thank you!             Your Updated Medication List - Protect others around you: Learn how to safely use, store and throw away your medicines at www.disposemymeds.org.          This list is accurate as of 6/26/18 10:46 AM.  Always use your most recent med list.                   Brand Name Dispense Instructions for use Diagnosis    clindamycin 1 % lotion    CLEOCIN T    60 mL    Apply to affected areas once daily.    Acne vulgaris       clindamycin-benzoyl Per (Refr) 1.2-5 % Gel     45 g    Apply to affected areas daily as needed    Acne vulgaris       tretinoin 0.025 % topical gel    RETIN-A    45 g    Spread a pea size amount into affected area topically at bedtime.  Use sunscreen SPF>20.    Acne vulgaris       valACYclovir 1000 mg tablet    VALTREX    12 tablet    Take 2 tablets (2,000 mg) by mouth 2 times daily for one day as needed.    Recurrent oral herpes simplex

## 2018-06-27 NOTE — TELEPHONE ENCOUNTER
Central Prior Authorization Team   Phone: [phone]      PA Initiation    Medication: Clindamycin Phos-Benzoyl 1.2-5 Gel  Insurance Company: [employer] - Phone [phone] Fax [phone]  Pharmacy Filling the Rx: [place], [place] - [address]  Filling Pharmacy Phone: [phone]  Filling Pharmacy Fax:    Start Date: 6/27/2018

## 2018-06-28 LAB
COPATH REPORT: NORMAL
PAP: NORMAL

## 2018-06-29 LAB
FINAL DIAGNOSIS: NORMAL
HPV HR 12 DNA CVX QL NAA+PROBE: NEGATIVE
HPV16 DNA SPEC QL NAA+PROBE: NEGATIVE
HPV18 DNA SPEC QL NAA+PROBE: NEGATIVE
SPECIMEN DESCRIPTION: NORMAL
SPECIMEN SOURCE CVX/VAG CYTO: NORMAL

## 2018-06-29 NOTE — TELEPHONE ENCOUNTER
PRIOR AUTHORIZATION DENIED    Medication: Clindamycin Phos-Benzoyl 1.2-5 Gel    Denial Date: 6/29/2018    Denial Rational: Patient must try and fail CLINDAMYCIN PHOSPHATE,BENZOYL PEROXIDE separately before they will cover a combination product.           Appeal Information: If provider would like to appeal please provide a letter of medical necessity stating why formulary alternatives would not be clinically appropriate for patient and route back to the PA team.

## 2018-07-02 NOTE — TELEPHONE ENCOUNTER
PA was denied. Please order alternative med with complete SIG or begin appeal process.     If you would like to appeal:   Create letter of medical necessity or    Compile supporting clinical documentation in EPIC Telephone encounter (TE).    Route TE to: [facility].

## 2018-07-05 NOTE — TELEPHONE ENCOUNTER
"Patient was prescribed these meds by Ruby \"Jaden\" ANITA Nix. This needs to go to the prescribing provider.   I have not seen the patient in over 4 years.    Lana Culver MD   "

## 2018-07-09 NOTE — TELEPHONE ENCOUNTER
Spoke with patient and she no longer needs medication. She cannot afford this and she no longer has insurance coverage.  ALLEGRA Leon

## 2018-07-09 NOTE — TELEPHONE ENCOUNTER
"Please call patient to touch base about this med - I think she pays out of pocket for it and is aware that it is NOT covered by insurance.  Thanks  Ruby \"Jaden\" ANITA Nix   "

## 2018-10-30 ENCOUNTER — OFFICE VISIT (OUTPATIENT)
Dept: FAMILY MEDICINE | Facility: CLINIC | Age: 43
End: 2018-10-30
Payer: COMMERCIAL

## 2018-10-30 VITALS
TEMPERATURE: 98.1 F | OXYGEN SATURATION: 100 % | DIASTOLIC BLOOD PRESSURE: 72 MMHG | SYSTOLIC BLOOD PRESSURE: 112 MMHG | HEART RATE: 75 BPM | WEIGHT: 124 LBS | BODY MASS INDEX: 23.43 KG/M2 | RESPIRATION RATE: 18 BRPM

## 2018-10-30 DIAGNOSIS — L70.0 ACNE VULGARIS: Primary | ICD-10-CM

## 2018-10-30 LAB
ALBUMIN SERPL-MCNC: 3.9 G/DL (ref 3.4–5)
ALP SERPL-CCNC: 67 U/L (ref 40–150)
ALT SERPL W P-5'-P-CCNC: 31 U/L (ref 0–50)
ANION GAP SERPL CALCULATED.3IONS-SCNC: 9 MMOL/L (ref 3–14)
AST SERPL W P-5'-P-CCNC: 22 U/L (ref 0–45)
BILIRUB SERPL-MCNC: 0.4 MG/DL (ref 0.2–1.3)
BUN SERPL-MCNC: 9 MG/DL (ref 7–30)
CALCIUM SERPL-MCNC: 9.2 MG/DL (ref 8.5–10.1)
CHLORIDE SERPL-SCNC: 106 MMOL/L (ref 94–109)
CO2 SERPL-SCNC: 25 MMOL/L (ref 20–32)
CREAT SERPL-MCNC: 0.66 MG/DL (ref 0.52–1.04)
GFR SERPL CREATININE-BSD FRML MDRD: >90 ML/MIN/1.7M2
GLUCOSE SERPL-MCNC: 90 MG/DL (ref 70–99)
POTASSIUM SERPL-SCNC: 4.5 MMOL/L (ref 3.4–5.3)
PROT SERPL-MCNC: 7.7 G/DL (ref 6.8–8.8)
SODIUM SERPL-SCNC: 140 MMOL/L (ref 133–144)
TSH SERPL DL<=0.005 MIU/L-ACNC: 1.12 MU/L (ref 0.4–4)

## 2018-10-30 PROCEDURE — 84443 ASSAY THYROID STIM HORMONE: CPT | Performed by: PHYSICIAN ASSISTANT

## 2018-10-30 PROCEDURE — 99000 SPECIMEN HANDLING OFFICE-LAB: CPT | Performed by: PHYSICIAN ASSISTANT

## 2018-10-30 PROCEDURE — 82157 ASSAY OF ANDROSTENEDIONE: CPT | Mod: 90 | Performed by: PHYSICIAN ASSISTANT

## 2018-10-30 PROCEDURE — 99213 OFFICE O/P EST LOW 20 MIN: CPT | Performed by: PHYSICIAN ASSISTANT

## 2018-10-30 PROCEDURE — 36415 COLL VENOUS BLD VENIPUNCTURE: CPT | Performed by: PHYSICIAN ASSISTANT

## 2018-10-30 PROCEDURE — 80053 COMPREHEN METABOLIC PANEL: CPT | Performed by: PHYSICIAN ASSISTANT

## 2018-10-30 PROCEDURE — 84403 ASSAY OF TOTAL TESTOSTERONE: CPT | Performed by: PHYSICIAN ASSISTANT

## 2018-10-30 RX ORDER — SPIRONOLACTONE 25 MG/1
25 TABLET ORAL DAILY
Qty: 30 TABLET | Refills: 1 | Status: SHIPPED | OUTPATIENT
Start: 2018-10-30 | End: 2019-03-15

## 2018-10-30 NOTE — PROGRESS NOTES
SUBJECTIVE:   Meseret Etienne is a 42 year old female who presents to clinic today for the following health issues:      Acne      Duration: years    Description (location/character/radiation): chin    Intensity:  mild    Accompanying signs and symptoms: none    History (similar episodes/previous evaluation): None    Therapies tried and outcome: OTC    Patient wants to try spirolactone     Most recently tried topical clindamycin +/- BP with retin-A           Problem list and histories reviewed & adjusted, as indicated.  Additional history: as documented    Patient Active Problem List   Diagnosis     Acne     Recurrent oral herpes simplex     Cervical high risk HPV (human papillomavirus) test positive     CARDIOVASCULAR SCREENING; LDL GOAL LESS THAN 160     Past Surgical History:   Procedure Laterality Date     DILATION AND CURETTAGE       NO HISTORY OF SURGERY         Social History   Substance Use Topics     Smoking status: Former Smoker     Packs/day: 1.00     Types: Cigarettes     Smokeless tobacco: Never Used      Comment: occ     Alcohol use Yes      Comment: occasionally     Family History   Problem Relation Age of Onset     Asthma Mother      Cardiovascular Maternal Grandmother      pacemaker     C.A.D. No family hx of      Diabetes No family hx of      Hypertension No family hx of      Breast Cancer No family hx of      Cancer - colorectal No family hx of      Cerebrovascular Disease No family hx of          Current Outpatient Prescriptions   Medication Sig Dispense Refill     clindamycin (CLEOCIN T) 1 % lotion Apply to affected areas once daily. 60 mL 3     clindamycin-benzoyl Per, Refr, 1.2-5 % GEL Apply to affected areas daily as needed 45 g 3     spironolactone (ALDACTONE) 25 MG tablet Take 1 tablet (25 mg) by mouth daily 30 tablet 1     tretinoin (RETIN-A) 0.025 % topical gel Spread a pea size amount into affected area topically at bedtime.  Use sunscreen SPF>20. 45 g 3     valACYclovir (VALTREX)  1000 mg tablet Take 2 tablets (2,000 mg) by mouth 2 times daily for one day as needed. 12 tablet 1     Allergies   Allergen Reactions     Penicillins Swelling     Eye lids       Latex Rash       Reviewed and updated as needed this visit by clinical staff  Tobacco  Allergies  Meds  Problems  Med Hx  Surg Hx  Fam Hx  Soc Hx        Reviewed and updated as needed this visit by Provider  Allergies  Meds  Problems         ROS:  Constitutional, HEENT, cardiovascular, pulmonary, GI, , musculoskeletal, neuro, skin, endocrine and psych systems are negative, except as otherwise noted.    OBJECTIVE:     /72 (BP Location: Left arm, Patient Position: Sitting, Cuff Size: Adult Regular)  Pulse 75  Temp 98.1  F (36.7  C) (Oral)  Resp 18  Wt 124 lb (56.2 kg)  SpO2 100%  BMI 23.43 kg/m2  Body mass index is 23.43 kg/(m^2).  GENERAL: healthy, alert and no distress  RESP: lungs clear to auscultation - no rales, rhonchi or wheezes  CV: regular rate and rhythm, normal S1 S2, no S3 or S4, no murmur, click or rub, no peripheral edema and peripheral pulses strong  SKIN: no suspicious lesions or rashes and acne along jaw line primarily with 2-3 larger pustules noted at this time    Diagnostic Test Results:  none     ASSESSMENT/PLAN:       ICD-10-CM    1. Acne vulgaris L70.0 spironolactone (ALDACTONE) 25 MG tablet     Comprehensive metabolic panel     **Comprehensive metabolic panel FUTURE anytime     TSH with free T4 reflex     Androstenedione     Testosterone, total       Patient Instructions   Labs updated today.  Trial of spironolactone 25 mg daiy with potential to increase to 50 mg after repeat labs in 2 weeks with lab only appointment.  Return to clinic with any worsening or changes in symptoms and follow up with PCP for routine care.     Adult Acne    If your skin is erupting with blemishes that you thought could only affect a teenager, you may have adult acne. This is acne in people over the age of 25. Acne in  teenagers is more common in teen boys. Acne in adults is more common in women.  Acne is the term for oil-clogged pores. Pores are tiny openings on the skin that become inflamed and form blemishes. Adult acne blemishes show up mainly on the face. In women, blemishes tend to show up around the chin, mouth, jawline, and neck. In men, acne often affects the entire face. But the trunk and upper arms can also be involved.  What causes acne?  Male hormones (androgens) may cause acne in some people. Women with certain conditions such as polycystic ovarian syndrome may make too much male hormones. Acne in women often may happen just before their menstrual periods. If you had acne when you were a teenager or if others in your family have had acne, you may be at more risk for adult acne. The good news is that acne can be treated. Treatments can also decrease the scarring and changes to skin color caused by acne.  Types of acne  Acne happens when certain hair follicles are damaged. One or more of 4 things happen:    The hair follicle is blocked by dead cells and oil (sebum)    The follicle makes more oil (sebum) than normal    Bacteria (P. acnes) grow in the follicle    The follicle becomes irritated (inflamed)  Four types of blemishes can appear:    Whiteheads are round, white blemishes that form when hair follicles become clogged.    Blackheads are round, dark blemishes that form when whiteheads reach the skin s surface and touch air.    Pimples are red, swollen bumps that form when plugged follicle walls break near the skin s surface.    Deep cysts are pus-filled pimples. They form when plugged follicle walls break deep within the skin. Acne cysts are often large and painful. In some cases, they also cause scars.  How treatment can help  The goals of treatment are to keep new acne blemishes from forming and to prevent scarring and changes in skin color. You will usually need a combination of treatments. You may need to use a  treatment for at least 2 months to see if it works. This is because acne lesions take at least 8 weeks to develop.  Your treatment will depend on how serious your acne is. You and your healthcare provider can discuss the best way to treat and control it. In most cases, acne treatment includes:    Good skin care that doesn t damage or irritate skin    Medicines put on the skin (topical)    Antibiotics, hormones, or both  Often you will need to take several medicines at first. For some treatments, women must use a birth control method so they won t get pregnant while being treated.  Your healthcare provider may also remove blemishes or give you injections. If you have acne scars, you may need surgery or medicines to help improve the way your skin looks. Be sure you understand your treatment plan and any side effects it might cause. You will play an important role in the success of your treatment.  Date Last Reviewed: 2/1/2017 2000-2017 [employer]. [address] [place] [address]. All rights reserved. This information is not intended as a substitute for professional medical care. Always follow your healthcare professional's instructions.        Spironolactone tablets  Brand Name: Aldactone  What is this medicine?  SPIRONOLACTONE (kera on oh LAK tone) is a diuretic. It helps you make more urine and to lose excess water from your body. This medicine is used to treat high blood pressure, and edema or swelling from heart, kidney, or liver disease. It is also used to treat patients who make too much aldosterone or have low potassium.  How should I use this medicine?  Take this medicine by mouth with a drink of water. Follow the directions on your prescription label. You can take it with or without food. If it upsets your stomach, take it with food. Do not take your medicine more often than directed. Remember that you will need to pass more urine after taking this medicine. Do not take your doses  at a time of day that will cause you problems. Do not take at bedtime.  Talk to your pediatrician regarding the use of this medicine in children. While this drug may be prescribed for selected conditions, precautions do apply.  What side effects may I notice from receiving this medicine?  Side effects that you should report to your doctor or health care professional as soon as possible:    allergic reactions such as skin rash or itching, hives, swelling of the lips, mouth, tongue, or throat    black or tarry stools    fast, irregular heartbeat    fever    muscle pain, cramps    numbness, tingling in hands or feet    trouble breathing    trouble passing urine    unusual bleeding    unusually weak or tired  Side effects that usually do not require medical attention (report to your doctor or health care professional if they continue or are bothersome):    change in voice or hair growth    confusion    dizzy, drowsy    dry mouth, increased thirst    enlarged or tender breasts    headache    irregular menstrual periods    sexual difficulty, unable to have an erection    stomach upset  What may interact with this medicine?  Do not take this medicine with any of the following medications:    eplerenone  This medicine may also interact with the following medications:    corticosteroids    digoxin    lithium    medicines for high blood pressure like ACE inhibitors    skeletal muscle relaxants like tubocurarine    NSAIDs, medicines for pain and inflammation, like ibuprofen or naproxen    potassium products like salt substitute or supplements    pressor amines like norepinephrine    some diuretics  What if I miss a dose?  If you miss a dose, take it as soon as you can. If it is almost time for your next dose, take only that dose. Do not take double or extra doses.  Where should I keep my medicine?  Keep out of the reach of children.  Store below 25 degrees C (77 degrees F). Throw away any unused medicine after the expiration  date.  What should I tell my health care provider before I take this medicine?  They need to know if you have any of these conditions:    high blood level of potassium    kidney disease or trouble making urine    liver disease    an unusual or allergic reaction to spironolactone, other medicines, foods, dyes, or preservatives    pregnant or trying to get pregnant    breast-feeding  What should I watch for while using this medicine?  Visit your doctor or health care professional for regular checks on your progress. Check your blood pressure as directed. Ask your doctor what your blood pressure should be, and when you should contact them.  You may need to be on a special diet while taking this medicine. Ask your doctor. Also, ask how many glasses of fluid you need to drink a day. You must not get dehydrated.  This medicine may make you feel confused, dizzy or lightheaded. Drinking alcohol and taking some medicines can make this worse. Do not drive, use machinery, or do anything that needs mental alertness until you know how this medicine affects you. Do not sit or stand up quickly.  NOTE:This sheet is a summary. It may not cover all possible information. If you have questions about this medicine, talk to your doctor, pharmacist, or health care provider. Copyright  2018 [employer]      Ruby Nix PA-C  [facility]

## 2018-10-30 NOTE — MR AVS SNAPSHOT
After Visit Summary   10/30/2018    Meseret Etienne    MRN: [id number]           Patient Information     Date Of Birth          1975        Visit Information        Provider Department      10/30/2018 10:20 AM Ruby Nix PA-C [facility]        Today's Diagnoses     Acne vulgaris    -  1      Care Instructions    Labs updated today.  Trial of spironolactone 25 mg daiy with potential to increase to 50 mg after repeat labs in 2 weeks with lab only appointment.  Return to clinic with any worsening or changes in symptoms and follow up with PCP for routine care.     Adult Acne    If your skin is erupting with blemishes that you thought could only affect a teenager, you may have adult acne. This is acne in people over the age of 25. Acne in teenagers is more common in teen boys. Acne in adults is more common in women.  Acne is the term for oil-clogged pores. Pores are tiny openings on the skin that become inflamed and form blemishes. Adult acne blemishes show up mainly on the face. In women, blemishes tend to show up around the chin, mouth, jawline, and neck. In men, acne often affects the entire face. But the trunk and upper arms can also be involved.  What causes acne?  Male hormones (androgens) may cause acne in some people. Women with certain conditions such as polycystic ovarian syndrome may make too much male hormones. Acne in women often may happen just before their menstrual periods. If you had acne when you were a teenager or if others in your family have had acne, you may be at more risk for adult acne. The good news is that acne can be treated. Treatments can also decrease the scarring and changes to skin color caused by acne.  Types of acne  Acne happens when certain hair follicles are damaged. One or more of 4 things happen:    The hair follicle is blocked by dead cells and oil (sebum)    The follicle makes more oil (sebum) than normal    Bacteria (P. acnes)  grow in the follicle    The follicle becomes irritated (inflamed)  Four types of blemishes can appear:    Whiteheads are round, white blemishes that form when hair follicles become clogged.    Blackheads are round, dark blemishes that form when whiteheads reach the skin s surface and touch air.    Pimples are red, swollen bumps that form when plugged follicle walls break near the skin s surface.    Deep cysts are pus-filled pimples. They form when plugged follicle walls break deep within the skin. Acne cysts are often large and painful. In some cases, they also cause scars.  How treatment can help  The goals of treatment are to keep new acne blemishes from forming and to prevent scarring and changes in skin color. You will usually need a combination of treatments. You may need to use a treatment for at least 2 months to see if it works. This is because acne lesions take at least 8 weeks to develop.  Your treatment will depend on how serious your acne is. You and your healthcare provider can discuss the best way to treat and control it. In most cases, acne treatment includes:    Good skin care that doesn t damage or irritate skin    Medicines put on the skin (topical)    Antibiotics, hormones, or both  Often you will need to take several medicines at first. For some treatments, women must use a birth control method so they won t get pregnant while being treated.  Your healthcare provider may also remove blemishes or give you injections. If you have acne scars, you may need surgery or medicines to help improve the way your skin looks. Be sure you understand your treatment plan and any side effects it might cause. You will play an important role in the success of your treatment.  Date Last Reviewed: 2/1/2017 2000-2017 [employer]. [address] [place] [id number]. All rights reserved. This information is not intended as a substitute for professional medical care. Always follow your healthcare  professional's instructions.        Spironolactone tablets  Brand Name: Aldactone  What is this medicine?  SPIRONOLACTONE (kera on oh LAK tone) is a diuretic. It helps you make more urine and to lose excess water from your body. This medicine is used to treat high blood pressure, and edema or swelling from heart, kidney, or liver disease. It is also used to treat patients who make too much aldosterone or have low potassium.  How should I use this medicine?  Take this medicine by mouth with a drink of water. Follow the directions on your prescription label. You can take it with or without food. If it upsets your stomach, take it with food. Do not take your medicine more often than directed. Remember that you will need to pass more urine after taking this medicine. Do not take your doses at a time of day that will cause you problems. Do not take at bedtime.  Talk to your pediatrician regarding the use of this medicine in children. While this drug may be prescribed for selected conditions, precautions do apply.  What side effects may I notice from receiving this medicine?  Side effects that you should report to your doctor or health care professional as soon as possible:    allergic reactions such as skin rash or itching, hives, swelling of the lips, mouth, tongue, or throat    black or tarry stools    fast, irregular heartbeat    fever    muscle pain, cramps    numbness, tingling in hands or feet    trouble breathing    trouble passing urine    unusual bleeding    unusually weak or tired  Side effects that usually do not require medical attention (report to your doctor or health care professional if they continue or are bothersome):    change in voice or hair growth    confusion    dizzy, drowsy    dry mouth, increased thirst    enlarged or tender breasts    headache    irregular menstrual periods    sexual difficulty, unable to have an erection    stomach upset  What may interact with this medicine?  Do not take  this medicine with any of the following medications:    eplerenone  This medicine may also interact with the following medications:    corticosteroids    digoxin    lithium    medicines for high blood pressure like ACE inhibitors    skeletal muscle relaxants like tubocurarine    NSAIDs, medicines for pain and inflammation, like ibuprofen or naproxen    potassium products like salt substitute or supplements    pressor amines like norepinephrine    some diuretics  What if I miss a dose?  If you miss a dose, take it as soon as you can. If it is almost time for your next dose, take only that dose. Do not take double or extra doses.  Where should I keep my medicine?  Keep out of the reach of children.  Store below 25 degrees C (77 degrees F). Throw away any unused medicine after the expiration date.  What should I tell my health care provider before I take this medicine?  They need to know if you have any of these conditions:    high blood level of potassium    kidney disease or trouble making urine    liver disease    an unusual or allergic reaction to spironolactone, other medicines, foods, dyes, or preservatives    pregnant or trying to get pregnant    breast-feeding  What should I watch for while using this medicine?  Visit your doctor or health care professional for regular checks on your progress. Check your blood pressure as directed. Ask your doctor what your blood pressure should be, and when you should contact them.  You may need to be on a special diet while taking this medicine. Ask your doctor. Also, ask how many glasses of fluid you need to drink a day. You must not get dehydrated.  This medicine may make you feel confused, dizzy or lightheaded. Drinking alcohol and taking some medicines can make this worse. Do not drive, use machinery, or do anything that needs mental alertness until you know how this medicine affects you. Do not sit or stand up quickly.  NOTE:This sheet is a summary. It may not cover all  possible information. If you have questions about this medicine, talk to your doctor, pharmacist, or health care provider. Copyright  2018 [employer]          Follow-ups after your visit        Follow-up notes from your care team     Return in about 3 months (around 1/30/2019).      Your next 10 appointments already scheduled     Nov 20, 2018 10:15 AM CST   LAB with [facility]   [facility]note 3] ([facility]note 3[facility]    [address] [place] [id number] [phone]           Please do not eat 10-12 hours before your appointment if you are coming in fasting for labs on lipids, cholesterol, or glucose (sugar). This does not apply to pregnant women. Water, hot tea and black coffee (with nothing added) are okay. Do not drink other fluids, diet soda or chew gum.              Future tests that were ordered for you today     Open Standing Orders        Priority Remaining Interval Expires Ordered    **Comprehensive metabolic panel FUTURE anytime Routine 4/4 2 weeks 10/30/2019 10/30/2018            Who to contact     If you have questions or need follow up information about today's clinic visit or your schedule please contact [facility] directly at [phone].  Normal or non-critical lab and imaging results will be communicated to you by MyChart, letter or phone within 4 business days after the clinic has received the results. If you do not hear from us within 7 days, please contact the clinic through MyChart or phone. If you have a critical or abnormal lab result, we will notify you by phone as soon as possible.  Submit refill requests through [employer] or call your pharmacy and they will forward the refill request to us. Please allow 3 business days for your refill to be completed.          Additional Information About Your Visit        Care EveryWhere ID     This is your Care EveryWhere ID. This could be used by other organizations to access your [facility]  records  [id number]        Your Vitals Were     Pulse Temperature Respirations Pulse Oximetry BMI (Body Mass Index)       75 98.1  F (36.7  C) (Oral) 18 100% 23.43 kg/m2        Blood Pressure from Last 3 Encounters:   10/30/18 112/72   06/26/18 118/76   03/17/17 124/75    Weight from Last 3 Encounters:   10/30/18 124 lb (56.2 kg)   06/26/18 120 lb (54.4 kg)   03/17/17 115 lb (52.2 kg)              We Performed the Following     Androstenedione     Comprehensive metabolic panel     Testosterone, total     TSH with free T4 reflex          Today's Medication Changes          These changes are accurate as of 10/30/18 10:40 AM.  If you have any questions, ask your nurse or doctor.               Start taking these medicines.        Dose/Directions    spironolactone 25 MG tablet   Commonly known as:  ALDACTONE   Used for:  Acne vulgaris   Started by:  Ruby Nix PA-C        Dose:  25 mg   Take 1 tablet (25 mg) by mouth daily   Quantity:  30 tablet   Refills:  1            Where to get your medicines      These medications were sent to [facility] [place], [place] - [address]  [address], [facility] [address]     Phone:  [phone]     spironolactone 25 MG tablet                Primary Care Provider Office Phone # Fax #    Lana Culver [phone]       [address]  [facility] [address]        Equal Access to Services     GLORIA VICKERS : Harjit salaso Sopaulette, waaxda luqadaha, qaybta kaalmada adeegyada, waxay alisa pizano adefrancis ceballos. So [facility] [phone].    ATENCIÓN: Si habla español, tiene a savage disposición servicios gratuitos de asistencia lingüística. Llame al [phone].    We comply with applicable federal civil rights laws and [place] laws. We do not discriminate on the basis of race, color, national origin, age, disability, sex, sexual orientation, or gender identity.            Thank you!     Thank you for choosing  [facility]  for your care. Our goal is always to provide you with excellent care. Hearing back from our patients is one way we can continue to improve our services. Please take a few minutes to complete the written survey that you may receive in the mail after your visit with us. Thank you!             Your Updated Medication List - Protect others around you: Learn how to safely use, store and throw away your medicines at www.disposemymeds.org.          This list is accurate as of 10/30/18 10:40 AM.  Always use your most recent med list.                   Brand Name Dispense Instructions for use Diagnosis    clindamycin 1 % lotion    CLEOCIN T    60 mL    Apply to affected areas once daily.    Acne vulgaris       clindamycin-benzoyl Per (Refr) 1.2-5 % Gel     45 g    Apply to affected areas daily as needed    Acne vulgaris       spironolactone 25 MG tablet    ALDACTONE    30 tablet    Take 1 tablet (25 mg) by mouth daily    Acne vulgaris       tretinoin 0.025 % topical gel    RETIN-A    45 g    Spread a pea size amount into affected area topically at bedtime.  Use sunscreen SPF>20.    Acne vulgaris       valACYclovir 1000 mg tablet    VALTREX    12 tablet    Take 2 tablets (2,000 mg) by mouth 2 times daily for one day as needed.    Recurrent oral herpes simplex

## 2018-10-30 NOTE — PATIENT INSTRUCTIONS
Labs updated today.  Trial of spironolactone 25 mg daiy with potential to increase to 50 mg after repeat labs in 2 weeks with lab only appointment.  Return to clinic with any worsening or changes in symptoms and follow up with PCP for routine care.     Adult Acne    If your skin is erupting with blemishes that you thought could only affect a teenager, you may have adult acne. This is acne in people over the age of 25. Acne in teenagers is more common in teen boys. Acne in adults is more common in women.  Acne is the term for oil-clogged pores. Pores are tiny openings on the skin that become inflamed and form blemishes. Adult acne blemishes show up mainly on the face. In women, blemishes tend to show up around the chin, mouth, jawline, and neck. In men, acne often affects the entire face. But the trunk and upper arms can also be involved.  What causes acne?  Male hormones (androgens) may cause acne in some people. Women with certain conditions such as polycystic ovarian syndrome may make too much male hormones. Acne in women often may happen just before their menstrual periods. If you had acne when you were a teenager or if others in your family have had acne, you may be at more risk for adult acne. The good news is that acne can be treated. Treatments can also decrease the scarring and changes to skin color caused by acne.  Types of acne  Acne happens when certain hair follicles are damaged. One or more of 4 things happen:    The hair follicle is blocked by dead cells and oil (sebum)    The follicle makes more oil (sebum) than normal    Bacteria (P. acnes) grow in the follicle    The follicle becomes irritated (inflamed)  Four types of blemishes can appear:    Whiteheads are round, white blemishes that form when hair follicles become clogged.    Blackheads are round, dark blemishes that form when whiteheads reach the skin s surface and touch air.    Pimples are red, swollen bumps that form when plugged follicle  walls break near the skin s surface.    Deep cysts are pus-filled pimples. They form when plugged follicle walls break deep within the skin. Acne cysts are often large and painful. In some cases, they also cause scars.  How treatment can help  The goals of treatment are to keep new acne blemishes from forming and to prevent scarring and changes in skin color. You will usually need a combination of treatments. You may need to use a treatment for at least 2 months to see if it works. This is because acne lesions take at least 8 weeks to develop.  Your treatment will depend on how serious your acne is. You and your healthcare provider can discuss the best way to treat and control it. In most cases, acne treatment includes:    Good skin care that doesn t damage or irritate skin    Medicines put on the skin (topical)    Antibiotics, hormones, or both  Often you will need to take several medicines at first. For some treatments, women must use a birth control method so they won t get pregnant while being treated.  Your healthcare provider may also remove blemishes or give you injections. If you have acne scars, you may need surgery or medicines to help improve the way your skin looks. Be sure you understand your treatment plan and any side effects it might cause. You will play an important role in the success of your treatment.  Date Last Reviewed: 2/1/2017 2000-2017 [employer]. [address] [place] [address], [place] [address]. All rights reserved. This information is not intended as a substitute for professional medical care. Always follow your healthcare professional's instructions.        Spironolactone tablets  Brand Name: Aldactone  What is this medicine?  SPIRONOLACTONE (kera on oh LAK tone) is a diuretic. It helps you make more urine and to lose excess water from your body. This medicine is used to treat high blood pressure, and edema or swelling from heart, kidney, or liver disease. It is also used to  treat patients who make too much aldosterone or have low potassium.  How should I use this medicine?  Take this medicine by mouth with a drink of water. Follow the directions on your prescription label. You can take it with or without food. If it upsets your stomach, take it with food. Do not take your medicine more often than directed. Remember that you will need to pass more urine after taking this medicine. Do not take your doses at a time of day that will cause you problems. Do not take at bedtime.  Talk to your pediatrician regarding the use of this medicine in children. While this drug may be prescribed for selected conditions, precautions do apply.  What side effects may I notice from receiving this medicine?  Side effects that you should report to your doctor or health care professional as soon as possible:    allergic reactions such as skin rash or itching, hives, swelling of the lips, mouth, tongue, or throat    black or tarry stools    fast, irregular heartbeat    fever    muscle pain, cramps    numbness, tingling in hands or feet    trouble breathing    trouble passing urine    unusual bleeding    unusually weak or tired  Side effects that usually do not require medical attention (report to your doctor or health care professional if they continue or are bothersome):    change in voice or hair growth    confusion    dizzy, drowsy    dry mouth, increased thirst    enlarged or tender breasts    headache    irregular menstrual periods    sexual difficulty, unable to have an erection    stomach upset  What may interact with this medicine?  Do not take this medicine with any of the following medications:    eplerenone  This medicine may also interact with the following medications:    corticosteroids    digoxin    lithium    medicines for high blood pressure like ACE inhibitors    skeletal muscle relaxants like tubocurarine    NSAIDs, medicines for pain and inflammation, like ibuprofen or naproxen    potassium  products like salt substitute or supplements    pressor amines like norepinephrine    some diuretics  What if I miss a dose?  If you miss a dose, take it as soon as you can. If it is almost time for your next dose, take only that dose. Do not take double or extra doses.  Where should I keep my medicine?  Keep out of the reach of children.  Store below 25 degrees C (77 degrees F). Throw away any unused medicine after the expiration date.  What should I tell my health care provider before I take this medicine?  They need to know if you have any of these conditions:    high blood level of potassium    kidney disease or trouble making urine    liver disease    an unusual or allergic reaction to spironolactone, other medicines, foods, dyes, or preservatives    pregnant or trying to get pregnant    breast-feeding  What should I watch for while using this medicine?  Visit your doctor or health care professional for regular checks on your progress. Check your blood pressure as directed. Ask your doctor what your blood pressure should be, and when you should contact them.  You may need to be on a special diet while taking this medicine. Ask your doctor. Also, ask how many glasses of fluid you need to drink a day. You must not get dehydrated.  This medicine may make you feel confused, dizzy or lightheaded. Drinking alcohol and taking some medicines can make this worse. Do not drive, use machinery, or do anything that needs mental alertness until you know how this medicine affects you. Do not sit or stand up quickly.  NOTE:This sheet is a summary. It may not cover all possible information. If you have questions about this medicine, talk to your doctor, pharmacist, or health care provider. Copyright  2018 [employer]

## 2018-10-31 LAB — TESTOST SERPL-MCNC: 37 NG/DL (ref 8–60)

## 2018-11-03 LAB — ANDROST SERPL-MCNC: 1.09 NG/ML (ref 0.13–0.82)

## 2018-11-06 ENCOUNTER — TELEPHONE (OUTPATIENT)
Dept: FAMILY MEDICINE | Facility: CLINIC | Age: 43
End: 2018-11-06

## 2018-11-06 NOTE — TELEPHONE ENCOUNTER
Jaden-Please advise if any follow up recommended for Androstenedione result?    Writer notes other lab results are normal.    Thank you!  PRACHI JuárezN, RN

## 2018-11-06 NOTE — TELEPHONE ENCOUNTER
Patient calling to get lab results that were done 10-3-18.  Please call today if possible.  OK to leave message on voicemail.

## 2018-11-06 NOTE — TELEPHONE ENCOUNTER
Writer called patient:  1. No answer  2. Left detailed voicemail relaying provider's comments and that letter will be sent to patient with lab result information  3. Call clinic back with any questions or concerns.    PRACHI JuárezN, RN

## 2018-11-27 DIAGNOSIS — L70.0 ACNE VULGARIS: ICD-10-CM

## 2018-11-27 LAB
ALBUMIN SERPL-MCNC: 4 G/DL (ref 3.4–5)
ALP SERPL-CCNC: 74 U/L (ref 40–150)
ALT SERPL W P-5'-P-CCNC: 31 U/L (ref 0–50)
ANION GAP SERPL CALCULATED.3IONS-SCNC: 7 MMOL/L (ref 3–14)
AST SERPL W P-5'-P-CCNC: 20 U/L (ref 0–45)
BILIRUB SERPL-MCNC: 0.5 MG/DL (ref 0.2–1.3)
BUN SERPL-MCNC: 13 MG/DL (ref 7–30)
CALCIUM SERPL-MCNC: 9.3 MG/DL (ref 8.5–10.1)
CHLORIDE SERPL-SCNC: 106 MMOL/L (ref 94–109)
CO2 SERPL-SCNC: 25 MMOL/L (ref 20–32)
CREAT SERPL-MCNC: 0.76 MG/DL (ref 0.52–1.04)
GFR SERPL CREATININE-BSD FRML MDRD: 83 ML/MIN/1.7M2
GLUCOSE SERPL-MCNC: 88 MG/DL (ref 70–99)
POTASSIUM SERPL-SCNC: 4.1 MMOL/L (ref 3.4–5.3)
PROT SERPL-MCNC: 8 G/DL (ref 6.8–8.8)
SODIUM SERPL-SCNC: 138 MMOL/L (ref 133–144)

## 2018-11-27 PROCEDURE — 80053 COMPREHEN METABOLIC PANEL: CPT | Performed by: PHYSICIAN ASSISTANT

## 2018-11-27 PROCEDURE — 36415 COLL VENOUS BLD VENIPUNCTURE: CPT | Performed by: PHYSICIAN ASSISTANT

## 2018-11-27 NOTE — PROGRESS NOTES
"Mimi Carl  Your attached labs are normal. Keep up the good work!  Please contact the office with any questions or concerns.    Ruby Patricia \"Jaden\" ANITA Nix  "

## 2019-03-15 ENCOUNTER — OFFICE VISIT (OUTPATIENT)
Dept: FAMILY MEDICINE | Facility: CLINIC | Age: 44
End: 2019-03-15
Payer: COMMERCIAL

## 2019-03-15 VITALS
HEART RATE: 65 BPM | SYSTOLIC BLOOD PRESSURE: 106 MMHG | BODY MASS INDEX: 23.03 KG/M2 | OXYGEN SATURATION: 99 % | RESPIRATION RATE: 20 BRPM | TEMPERATURE: 98.1 F | WEIGHT: 122 LBS | DIASTOLIC BLOOD PRESSURE: 68 MMHG | HEIGHT: 61 IN

## 2019-03-15 DIAGNOSIS — L70.0 ACNE VULGARIS: Primary | ICD-10-CM

## 2019-03-15 LAB
ALBUMIN SERPL-MCNC: 3.9 G/DL (ref 3.4–5)
ALP SERPL-CCNC: 72 U/L (ref 40–150)
ALT SERPL W P-5'-P-CCNC: 28 U/L (ref 0–50)
ANION GAP SERPL CALCULATED.3IONS-SCNC: 5 MMOL/L (ref 3–14)
AST SERPL W P-5'-P-CCNC: 20 U/L (ref 0–45)
BILIRUB SERPL-MCNC: 0.5 MG/DL (ref 0.2–1.3)
BUN SERPL-MCNC: 10 MG/DL (ref 7–30)
CALCIUM SERPL-MCNC: 9.1 MG/DL (ref 8.5–10.1)
CHLORIDE SERPL-SCNC: 108 MMOL/L (ref 94–109)
CO2 SERPL-SCNC: 27 MMOL/L (ref 20–32)
CREAT SERPL-MCNC: 0.76 MG/DL (ref 0.52–1.04)
GFR SERPL CREATININE-BSD FRML MDRD: >90 ML/MIN/{1.73_M2}
GLUCOSE SERPL-MCNC: 85 MG/DL (ref 70–99)
POTASSIUM SERPL-SCNC: 4 MMOL/L (ref 3.4–5.3)
PROT SERPL-MCNC: 7.3 G/DL (ref 6.8–8.8)
SODIUM SERPL-SCNC: 140 MMOL/L (ref 133–144)

## 2019-03-15 PROCEDURE — 36415 COLL VENOUS BLD VENIPUNCTURE: CPT | Performed by: PHYSICIAN ASSISTANT

## 2019-03-15 PROCEDURE — 80053 COMPREHEN METABOLIC PANEL: CPT | Performed by: PHYSICIAN ASSISTANT

## 2019-03-15 PROCEDURE — 99213 OFFICE O/P EST LOW 20 MIN: CPT | Performed by: PHYSICIAN ASSISTANT

## 2019-03-15 RX ORDER — TRETINOIN 0.25 MG/G
GEL TOPICAL
Qty: 45 G | Refills: 3 | Status: SHIPPED | OUTPATIENT
Start: 2019-03-15

## 2019-03-15 RX ORDER — SPIRONOLACTONE 25 MG/1
25 TABLET ORAL DAILY
Qty: 90 TABLET | Refills: 1 | Status: SHIPPED | OUTPATIENT
Start: 2019-03-15

## 2019-03-15 ASSESSMENT — MIFFLIN-ST. JEOR: SCORE: 1150.77

## 2019-03-15 NOTE — PATIENT INSTRUCTIONS
Labs updated today.  Refills sent to pharmacy  Repeat Complete Metabolic Panel in 6-12 months.  Return to clinic with any worsening or changes in symptoms and follow up with PCP for routine care.

## 2019-03-15 NOTE — PROGRESS NOTES
SUBJECTIVE:   Meseret Etienne is a 42 year old female who presents to clinic today for the following health issues:      Acne    Duration: years    Description (location/character/radiation): chin    Intensity:  mild    Accompanying signs and symptoms: none    History (similar episodes/previous evaluation): None    Therapies tried and outcome: OTC    Previously tried topical clindamycin +/- BP with retin-A    Has been on spironolactone for a few months with good results, recently stopped because ran out and symptoms flared again; restarted with improvement in 3 days.    Can often take every other day with good results still.    Tolerating well with no issues or side effects noted.    Would also like refill on retin-a for occasional use.           Problem list and histories reviewed & adjusted, as indicated.  Additional history: as documented    Patient Active Problem List   Diagnosis     Acne     Recurrent oral herpes simplex     Cervical high risk HPV (human papillomavirus) test positive     CARDIOVASCULAR SCREENING; LDL GOAL LESS THAN 160     Past Surgical History:   Procedure Laterality Date     DILATION AND CURETTAGE       NO HISTORY OF SURGERY         Social History     Tobacco Use     Smoking status: Former Smoker     Packs/day: 1.00     Types: Cigarettes     Smokeless tobacco: Never Used     Tobacco comment: occ   Substance Use Topics     Alcohol use: Yes     Comment: occasionally     Family History   Problem Relation Age of Onset     Asthma Mother      Cardiovascular Maternal Grandmother         pacemaker     C.A.D. No family hx of      Diabetes No family hx of      Hypertension No family hx of      Breast Cancer No family hx of      Cancer - colorectal No family hx of      Cerebrovascular Disease No family hx of          Current Outpatient Medications   Medication Sig Dispense Refill     spironolactone (ALDACTONE) 25 MG tablet Take 1 tablet (25 mg) by mouth daily 90 tablet 1     tretinoin (RETIN-A)  "0.025 % external gel Spread a pea size amount into affected area topically at bedtime.  Use sunscreen SPF>20. 45 g 3     valACYclovir (VALTREX) 1000 mg tablet Take 2 tablets (2,000 mg) by mouth 2 times daily for one day as needed. 12 tablet 1     clindamycin (CLEOCIN T) 1 % lotion Apply to affected areas once daily. (Patient not taking: Reported on 3/15/2019) 60 mL 3     clindamycin-benzoyl Per, Refr, 1.2-5 % GEL Apply to affected areas daily as needed (Patient not taking: Reported on 3/15/2019) 45 g 3     Allergies   Allergen Reactions     Penicillins Swelling     Eye lids       Latex Rash       Reviewed and updated as needed this visit by clinical staff  Tobacco  Allergies  Meds  Problems  Med Hx  Surg Hx  Fam Hx  Soc Hx        Reviewed and updated as needed this visit by Provider  Tobacco  Allergies  Meds  Problems  Med Hx  Surg Hx  Fam Hx         ROS:  Constitutional, HEENT, cardiovascular, pulmonary, GI, , musculoskeletal, neuro, skin, endocrine and psych systems are negative, except as otherwise noted.    OBJECTIVE:     /68 (BP Location: Left arm, Patient Position: Sitting, Cuff Size: Adult Regular)   Pulse 65   Temp 98.1  F (36.7  C) (Oral)   Resp 20   Ht 1.549 m (5' 1\")   Wt 55.3 kg (122 lb)   SpO2 99%   BMI 23.05 kg/m    Body mass index is 23.05 kg/m .  GENERAL: healthy, alert and no distress  RESP: lungs clear to auscultation - no rales, rhonchi or wheezes  CV: regular rate and rhythm, normal S1 S2, no S3 or S4, no murmur, click or rub, no peripheral edema and peripheral pulses strong  SKIN: no suspicious lesions or rashes and acne along jaw line primarily with 2-3 larger pustules noted at this time    Diagnostic Test Results:  none     ASSESSMENT/PLAN:       ICD-10-CM    1. Acne vulgaris L70.0 Comprehensive metabolic panel     spironolactone (ALDACTONE) 25 MG tablet     tretinoin (RETIN-A) 0.025 % external gel       Patient Instructions   Labs updated today.  Refills sent to " pharmacy  Repeat Complete Metabolic Panel in 6-12 months.  Return to clinic with any worsening or changes in symptoms and follow up with PCP for routine care.       Ruby Nix PA-C  [facility]

## 2019-10-02 ENCOUNTER — HEALTH MAINTENANCE LETTER (OUTPATIENT)
Age: 44
End: 2019-10-02

## 2020-12-03 NOTE — PROGRESS NOTES
"SUBJECTIVE:   Meseret Etienne is a 41 year old female presenting with a chief complaint of ENT symptoms:  Symptoms started 3/13 or so and  include: fever last night, sore throat, headache and myalgias.  Noticed white spots on throat today.   No cough/sneeze or nasal symptoms.  Reports same symptoms last month, seen x 2, treated and resolved with zithromax started on 2/15.   No prior h/o frequent tonsil infection.  Course of illness is: worsening.    Treatment measures tried:  OTC meds.  Predisposing factors include:  Non smoker, no h/o asthma.     ROS:  5-Point Review of Systems Negative-- Except as stated above.    OBJECTIVE  /75 (BP Location: Left arm, Patient Position: Chair, Cuff Size: Adult Regular)  Pulse 91  Temp 97.5  F (36.4  C) (Tympanic)  Ht 5' 1\" (1.549 m)  Wt 115 lb (52.2 kg)  LMP 02/17/2017  SpO2 97%  Breastfeeding? No  BMI 21.73 kg/m2  GENERAL:  Awake, alert and interactive. No acute distress.  HEENT:   NC/AT, EOMI, clear conjunctiva.  Clear nasal discharge.  Oropharynx with mild diffuse erythema, tonsils erythematous and 1+ BL with exudate.  TM's and EAC's benign.  NECK: supple and free of adenopathy  CHEST:  Lungs are clear, no rhonchi, wheezing or rales. Normal symmetric air entry throughout both lung fields.   HEART:  S1 and S2 normal, no murmurs, clicks, gallops or rubs. Regular rate and rhythm.    Results for orders placed or performed in visit on 03/17/17   Strep, Rapid Screen   Result Value Ref Range    Specimen Description Throat     Rapid Strep A Screen       NEGATIVE: No Group A streptococcal antigen detected by immunoassay, await   culture report.      Micro Report Status FINAL 03/17/2017          ASSESSMENT/PLAN    ICD-10-CM    1. Tonsillitis J03.90 clindamycin (CLEOCIN) 300 MG capsule   2. Throat pain R07.0 Strep, Rapid Screen     Beta strep group A culture     Fevers developed 3 days into illness, worsening sore throat.   Going out of town.  Will cover with abx.  PCN allergic, " unknown if ever on cephalosporins.  Very ill when given flouroquinolone in past.   Off macrolide less than a month ago.  Will go with clinda today.   We discussed the expected course and symptomatic cares in detail, including return to care if symptoms not improving as expected, do not resolve completely, or if any new or worsening symptoms develop.         (4) no limitation

## 2021-01-15 ENCOUNTER — HEALTH MAINTENANCE LETTER (OUTPATIENT)
Age: 46
End: 2021-01-15

## 2021-09-05 ENCOUNTER — HEALTH MAINTENANCE LETTER (OUTPATIENT)
Age: 46
End: 2021-09-05

## 2021-12-26 ENCOUNTER — HEALTH MAINTENANCE LETTER (OUTPATIENT)
Age: 46
End: 2021-12-26

## 2022-02-20 ENCOUNTER — HEALTH MAINTENANCE LETTER (OUTPATIENT)
Age: 47
End: 2022-02-20

## 2022-10-23 ENCOUNTER — HEALTH MAINTENANCE LETTER (OUTPATIENT)
Age: 47
End: 2022-10-23

## 2023-04-01 ENCOUNTER — HEALTH MAINTENANCE LETTER (OUTPATIENT)
Age: 48
End: 2023-04-01

## 2023-11-24 NOTE — LETTER
November 7, 2018      Meseret Etienne  [address]  [facility] [id number]        Dear ,    We are writing to inform you of your test results.    Normal results.     Resulted Orders   Comprehensive metabolic panel   Result Value Ref Range    Sodium 140 133 - 144 mmol/L    Potassium 4.5 3.4 - 5.3 mmol/L    Chloride 106 94 - 109 mmol/L    Carbon Dioxide 25 20 - 32 mmol/L    Anion Gap 9 3 - 14 mmol/L    Glucose 90 70 - 99 mg/dL      Comment:      Non Fasting    Urea Nitrogen 9 7 - 30 mg/dL    Creatinine 0.66 0.52 - 1.04 mg/dL    GFR Estimate >90 >60 mL/min/1.7m2      Comment:      Non  GFR Calc    GFR Estimate If Black >90 >60 mL/min/1.7m2      Comment:       GFR Calc    Calcium 9.2 8.5 - 10.1 mg/dL    Bilirubin Total 0.4 0.2 - 1.3 mg/dL    Albumin 3.9 3.4 - 5.0 g/dL    Protein Total 7.7 6.8 - 8.8 g/dL    Alkaline Phosphatase 67 40 - 150 U/L    ALT 31 0 - 50 U/L    AST 22 0 - 45 U/L   TSH with free T4 reflex   Result Value Ref Range    TSH 1.12 0.40 - 4.00 mU/L   Androstenedione   Result Value Ref Range    Androstenedione 1.090 (H) 0.130 - 0.820 ng/mL      Comment:      (Note)  INTERPRETIVE INFORMATION: Androstenedione, Females 18 years   and older  Pre-menopausal: 0.26-2.14 ng/mL  Post-menopausal: 0.13-0.82 ng/mL  REFERENCE INTERVAL: Androstenedione by TMS  Access complete set of age- and/or gender-specific   reference intervals for this test in the [employer]   Test Directory ([facility] [facility]).  Test developed and characteristics determined by [email or web address]. See Compliance Statement B: [employer] [facility]/CS  Performed by [email or web address],  [address] [facility] [place] [address] [phone]  [email or web address] [facility], Miah Ram MD, Lab. Director     Testosterone, total   Result Value Ref Range    Testosterone Total 37 8 - 60 ng/dL      Comment:      This test was developed and its performance characteristics determined by the   [facility],  [facility]  Will monitor.  A1C 5.8 on 10/27/23   [facility]istry Laboratory. It has   not been cleared or approved by the FDA. The laboratory is regulated under   CLIA as qualified to perform high-complexity testing. This test is used for   clinical purposes. It should not be regarded as investigational or for   research.         If you have any questions or concerns, please call the clinic at the number listed above.       Sincerely,        Ruby Nix PA-C/nr